# Patient Record
Sex: MALE | Race: OTHER | HISPANIC OR LATINO | Employment: FULL TIME | URBAN - METROPOLITAN AREA
[De-identification: names, ages, dates, MRNs, and addresses within clinical notes are randomized per-mention and may not be internally consistent; named-entity substitution may affect disease eponyms.]

---

## 2024-06-05 ENCOUNTER — HOSPITAL ENCOUNTER (EMERGENCY)
Facility: HOSPITAL | Age: 24
Discharge: HOME | End: 2024-06-05
Attending: EMERGENCY MEDICINE

## 2024-06-05 VITALS
TEMPERATURE: 98 F | DIASTOLIC BLOOD PRESSURE: 84 MMHG | SYSTOLIC BLOOD PRESSURE: 137 MMHG | HEIGHT: 66 IN | BODY MASS INDEX: 27.63 KG/M2 | WEIGHT: 171.94 LBS | OXYGEN SATURATION: 99 % | HEART RATE: 72 BPM | RESPIRATION RATE: 20 BRPM

## 2024-06-05 DIAGNOSIS — R55 SYNCOPE: ICD-10-CM

## 2024-06-05 DIAGNOSIS — L03.012 CELLULITIS OF FINGER OF LEFT HAND: Primary | ICD-10-CM

## 2024-06-05 LAB
ALBUMIN SERPL-MCNC: 4.4 G/DL (ref 3.5–5)
ALBUMIN/GLOB SERPL: 1.1 RATIO (ref 1.1–2)
ALP SERPL-CCNC: 117 UNIT/L (ref 40–150)
ALT SERPL-CCNC: 23 UNIT/L (ref 0–55)
ANION GAP SERPL CALC-SCNC: 8 MEQ/L
AST SERPL-CCNC: 16 UNIT/L (ref 5–34)
BASOPHILS # BLD AUTO: 0.03 X10(3)/MCL
BASOPHILS NFR BLD AUTO: 0.3 %
BILIRUB SERPL-MCNC: 0.5 MG/DL
BUN SERPL-MCNC: 12.2 MG/DL (ref 8.9–20.6)
CALCIUM SERPL-MCNC: 9.3 MG/DL (ref 8.4–10.2)
CHLORIDE SERPL-SCNC: 105 MMOL/L (ref 98–107)
CO2 SERPL-SCNC: 27 MMOL/L (ref 22–29)
CREAT SERPL-MCNC: 1 MG/DL (ref 0.73–1.18)
CREAT/UREA NIT SERPL: 12
EOSINOPHIL # BLD AUTO: 0.46 X10(3)/MCL (ref 0–0.9)
EOSINOPHIL NFR BLD AUTO: 4.3 %
ERYTHROCYTE [DISTWIDTH] IN BLOOD BY AUTOMATED COUNT: 14 % (ref 11.5–17)
GFR SERPLBLD CREATININE-BSD FMLA CKD-EPI: >60 ML/MIN/1.73/M2
GLOBULIN SER-MCNC: 4.1 GM/DL (ref 2.4–3.5)
GLUCOSE SERPL-MCNC: 86 MG/DL (ref 74–100)
HCT VFR BLD AUTO: 48.5 % (ref 42–52)
HGB BLD-MCNC: 15.7 G/DL (ref 14–18)
IMM GRANULOCYTES # BLD AUTO: 0.07 X10(3)/MCL (ref 0–0.04)
IMM GRANULOCYTES NFR BLD AUTO: 0.7 %
LACTATE SERPL-SCNC: 1.2 MMOL/L (ref 0.5–2.2)
LYMPHOCYTES # BLD AUTO: 1.87 X10(3)/MCL (ref 0.6–4.6)
LYMPHOCYTES NFR BLD AUTO: 17.5 %
MCH RBC QN AUTO: 26.4 PG (ref 27–31)
MCHC RBC AUTO-ENTMCNC: 32.4 G/DL (ref 33–36)
MCV RBC AUTO: 81.6 FL (ref 80–94)
MONOCYTES # BLD AUTO: 1.1 X10(3)/MCL (ref 0.1–1.3)
MONOCYTES NFR BLD AUTO: 10.3 %
NEUTROPHILS # BLD AUTO: 7.16 X10(3)/MCL (ref 2.1–9.2)
NEUTROPHILS NFR BLD AUTO: 66.9 %
NRBC BLD AUTO-RTO: 0 %
OHS QRS DURATION: 88 MS
OHS QTC CALCULATION: 426 MS
PLATELET # BLD AUTO: 332 X10(3)/MCL (ref 130–400)
PMV BLD AUTO: 8.9 FL (ref 7.4–10.4)
POTASSIUM SERPL-SCNC: 4.3 MMOL/L (ref 3.5–5.1)
PROT SERPL-MCNC: 8.5 GM/DL (ref 6.4–8.3)
RBC # BLD AUTO: 5.94 X10(6)/MCL (ref 4.7–6.1)
SODIUM SERPL-SCNC: 140 MMOL/L (ref 136–145)
WBC # SPEC AUTO: 10.69 X10(3)/MCL (ref 4.5–11.5)

## 2024-06-05 PROCEDURE — 87040 BLOOD CULTURE FOR BACTERIA: CPT | Performed by: EMERGENCY MEDICINE

## 2024-06-05 PROCEDURE — 63600175 PHARM REV CODE 636 W HCPCS: Performed by: EMERGENCY MEDICINE

## 2024-06-05 PROCEDURE — 80053 COMPREHEN METABOLIC PANEL: CPT | Performed by: EMERGENCY MEDICINE

## 2024-06-05 PROCEDURE — 93010 ELECTROCARDIOGRAM REPORT: CPT | Mod: ,,, | Performed by: INTERNAL MEDICINE

## 2024-06-05 PROCEDURE — 83605 ASSAY OF LACTIC ACID: CPT | Performed by: EMERGENCY MEDICINE

## 2024-06-05 PROCEDURE — 90715 TDAP VACCINE 7 YRS/> IM: CPT | Performed by: EMERGENCY MEDICINE

## 2024-06-05 PROCEDURE — 85025 COMPLETE CBC W/AUTO DIFF WBC: CPT | Performed by: EMERGENCY MEDICINE

## 2024-06-05 PROCEDURE — 93005 ELECTROCARDIOGRAM TRACING: CPT

## 2024-06-05 PROCEDURE — 99285 EMERGENCY DEPT VISIT HI MDM: CPT | Mod: 25

## 2024-06-05 PROCEDURE — 90471 IMMUNIZATION ADMIN: CPT | Performed by: EMERGENCY MEDICINE

## 2024-06-05 PROCEDURE — 96365 THER/PROPH/DIAG IV INF INIT: CPT

## 2024-06-05 RX ORDER — CLINDAMYCIN HYDROCHLORIDE 300 MG/1
300 CAPSULE ORAL EVERY 6 HOURS
Qty: 40 CAPSULE | Refills: 0 | Status: ON HOLD | OUTPATIENT
Start: 2024-06-05 | End: 2024-06-09 | Stop reason: HOSPADM

## 2024-06-05 RX ORDER — HYDROCODONE BITARTRATE AND ACETAMINOPHEN 5; 325 MG/1; MG/1
1 TABLET ORAL EVERY 6 HOURS PRN
Qty: 12 TABLET | Refills: 0 | Status: SHIPPED | OUTPATIENT
Start: 2024-06-05 | End: 2024-06-17

## 2024-06-05 RX ORDER — CLINDAMYCIN PHOSPHATE 900 MG/50ML
900 INJECTION, SOLUTION INTRAVENOUS
Status: COMPLETED | OUTPATIENT
Start: 2024-06-05 | End: 2024-06-05

## 2024-06-05 RX ADMIN — CLINDAMYCIN PHOSPHATE 900 MG: 900 INJECTION, SOLUTION INTRAVENOUS at 10:06

## 2024-06-05 RX ADMIN — TETANUS TOXOID, REDUCED DIPHTHERIA TOXOID AND ACELLULAR PERTUSSIS VACCINE, ADSORBED 0.5 ML: 5; 2.5; 8; 8; 2.5 SUSPENSION INTRAMUSCULAR at 10:06

## 2024-06-05 NOTE — Clinical Note
"Todd "Todd" Renny Garza was seen and treated in our emergency department on 6/5/2024.  He may return to work on 06/07/2024.       If you have any questions or concerns, please don't hesitate to call.      Elodia Powers MD"

## 2024-06-05 NOTE — DISCHARGE INSTRUCTIONS
Return immediately for any worsening symptoms or if it fails to improve over the next 3 days.  The CT scan did not show an abscess or foreign body but it is still possible there could be something in there that would require a procedure or surgery

## 2024-06-05 NOTE — ED PROVIDER NOTES
Encounter Date: 6/5/2024       History     Chief Complaint   Patient presents with    Hand Pain     L hand pain and swelling for 2 days. States thinks a splinter is in there. States got so bad he passed out     23-year-old male, [place] video , states that he had a puncture wound to left thumb about 2 weeks ago.  Over the last 2 days he has had redness pain and swelling to the left hand in his concerned he could have a splinter in his hand from two weeks ago but does not feel anything.  Yesterday, the pain was so severe that he passed out.  No fever.  No injuries.  Unknown tetanus vaccination.        Review of patient's allergies indicates:  No Known Allergies  No past medical history on file.  No past surgical history on file.  No family history on file.     Review of Systems   Musculoskeletal:         Hand pain   Neurological:  Positive for syncope.   All other systems reviewed and are negative.      Physical Exam     Initial Vitals [06/05/24 0954]   BP Pulse Resp Temp SpO2   120/78 92 20 98.3 °F (36.8 °C) 99 %      MAP       --         Physical Exam    Nursing note and vitals reviewed.  Constitutional: Vital signs are normal. He appears well-developed and well-nourished.   HENT:   Head: Normocephalic and atraumatic.   Mouth/Throat: Oropharynx is clear and moist.   Eyes: Pupils are equal, round, and reactive to light.   Neck: Neck supple. No JVD present.   Cardiovascular:  Normal rate, regular rhythm and normal heart sounds.           Pulmonary/Chest: Breath sounds normal. No respiratory distress.   Abdominal: Abdomen is soft. There is no abdominal tenderness.   Musculoskeletal:         General: No edema.      Cervical back: Neck supple. No edema or erythema.      Comments: Right hand has erythema and swelling over the metacarpophalangeal joint which is extremely tender and firm to palpation, no palpable fluctuance     Lymphadenopathy:     He has no cervical adenopathy.   Neurological: He is alert and  oriented to person, place, and time. No cranial nerve deficit. GCS score is 15. GCS eye subscore is 4. GCS verbal subscore is 5. GCS motor subscore is 6.   Skin: Skin is warm and dry. Capillary refill takes less than 2 seconds.         ED Course   Procedures  Labs Reviewed   COMPREHENSIVE METABOLIC PANEL - Abnormal; Notable for the following components:       Result Value    Protein Total 8.5 (*)     Globulin 4.1 (*)     All other components within normal limits   CBC WITH DIFFERENTIAL - Abnormal; Notable for the following components:    MCH 26.4 (*)     MCHC 32.4 (*)     IG# 0.07 (*)     All other components within normal limits   LACTIC ACID, PLASMA - Normal   BLOOD CULTURE OLG   BLOOD CULTURE OLG   CBC W/ AUTO DIFFERENTIAL    Narrative:     The following orders were created for panel order CBC auto differential.  Procedure                               Abnormality         Status                     ---------                               -----------         ------                     CBC with Differential[[id number]]       Abnormal            Final result                 Please view results for these tests on the individual orders.     EKG Readings: (Independently Interpreted)   Initial Reading: No STEMI. Rhythm: Normal Sinus Rhythm. Heart Rate: 86. ST Segments: Normal ST Segments. T Waves: Normal. Clinical Impression: Normal Sinus Rhythm     ECG Results              EKG 12-lead (Final result)        Collection Time Result Time QRS Duration OHS QTC Calculation    06/05/24 10:15:43 06/05/24 13:36:38 88 426                     Final result by Interface, Lab In [facility] (06/05/24 13:36:49)                   Narrative:    Test Reason : R55,    Vent. Rate : 086 BPM     Atrial Rate : 086 BPM     P-R Int : 150 ms          QRS Dur : 088 ms      QT Int : 356 ms       P-R-T Axes : 080 112 021 degrees     QTc Int : 426 ms    Normal sinus rhythm  Right axis deviation  Possible Right ventricular hypertrophy  Abnormal ECG  No  previous ECGs available  Confirmed by Sj Lees MD ([id number]) on 6/5/2024 1:36:32 PM    Referred By: [email or web address]   SELF           Confirmed By:Sj Lees MD                                  Imaging Results              CT Hand Without Contrast Left (Final result)  Result time 06/05/24 12:09:54      Final result by Mick Hobson MD (06/05/24 12:09:54)                   Impression:      Small foreign body as discussed.      Electronically signed by: Mick Hobson  Date:    06/05/2024  Time:    12:09               Narrative:    EXAMINATION:  CT HAND WITHOUT CONTRAST LEFT    CLINICAL HISTORY:  Soft tissue infection suspected, hand, xray done;Evaluate for foreign body left hand;    TECHNIQUE:  Helical acquisition through the left hand without IV contrast.  Three plane reconstructions were provided for review.  mGycm. Automatic exposure control, adjustment of mA/kV or iterative reconstruction technique was used to reduce radiation.    COMPARISON:  No priors    FINDINGS:  There is a 3-4 mm foreign body within the dorsal subcutaneous fat adjacent to the distal shaft of the 2nd metacarpal.  No other foreign body is seen.  No fracture or dislocation.  No fluid collection seen.                                       Medications   clindamycin in D5W 900 mg/50 mL IVPB 900 mg (0 mg Intravenous Stopped 6/5/24 1118)   Tdap (BOOSTRIX) vaccine injection 0.5 mL (0.5 mLs Intramuscular Given 6/5/24 1036)     Medical Decision Making  See HPI for narrative    Differential diagnosis includes but is not limited to cellulitis, abscess, foreign body, syncope    Problems Addressed:  Cellulitis of finger of left hand:     Details: Patient given IV antibiotics in the emergency room and hospital admission suggested but he declined.  Prescriptions were discussed using the Italian video  and ER return precautions were discussed as well    Amount and/or Complexity of Data Reviewed  Labs: ordered.  Radiology: ordered. Decision-making  "details documented in ED Course.    Risk  Prescription drug management.  Risk Details: No abscess or foreign body noted to the area of concern on the CT scan.  However, foreign body such as a splinter might not show on the CT               ED Course as of 06/05/24 1444   Wed Jun 05, 2024   1249 CT Hand Without Contrast Left  CT scan shows a metallic foreign body on the dorsal aspect of the hand over the 2nd metacarpal.  He does have a tiny discolored dot at this area which may be the foreign body.  There is no tenderness, redness, swelling, or sign of injury to this area.  The redness and swelling is to the volar aspect the thumb as seen above.  There is no palpable fluctuance and no abscess on the CT.  Hospital admission was discussed and he prefers to take the antibiotics at home.  Strict ER return precautions were discussed using the video .  This was discussed in the middle of the conversation and repeated again of the end of the conversation.  He understands to take his antibiotics every 6 hours and return immediately for any worsening symptoms. []   1255 Hospital admission discussed a second time and he definitely wants to try outpatient antibiotics first and promises to return if any worsening or failure to improve [SH]   1310 Nadine RN discussed hospital admission vs outpatient tx as well and pt says "No, no, I'm OK."  She repeated instructions to return immediately for any worsening symptoms []      ED Course User Index  [SH] Elodia Powers MD                             Clinical Impression:  Final diagnoses:  [R55] Syncope  [L03.012] Cellulitis of finger of left hand (Primary)          ED Disposition Condition    Discharge Stable          ED Prescriptions       Medication Sig Dispense Start Date End Date Auth. Provider    clindamycin (CLEOCIN) 300 MG capsule Take 1 capsule (300 mg total) by mouth every 6 (six) hours. 40 capsule 6/5/2024 -- Elodia Powers MD    " HYDROcodone-acetaminophen (NORCO) 5-325 mg per tablet Take 1 tablet by mouth every 6 (six) hours as needed for Pain. 12 tablet 6/5/2024 -- Elodia Powers MD          Follow-up Information    None          Elodia Powers MD  06/05/24 1257       Elodia Powers MD  06/05/24 9105

## 2024-06-06 ENCOUNTER — HOSPITAL ENCOUNTER (INPATIENT)
Facility: HOSPITAL | Age: 24
LOS: 3 days | Discharge: HOME | DRG: 580 | End: 2024-06-09
Attending: EMERGENCY MEDICINE | Admitting: INTERNAL MEDICINE

## 2024-06-06 DIAGNOSIS — L02.512 ABSCESS OF LEFT HAND: Primary | ICD-10-CM

## 2024-06-06 LAB
ALBUMIN SERPL-MCNC: 4.2 G/DL (ref 3.5–5)
ALBUMIN/GLOB SERPL: 1 RATIO (ref 1.1–2)
ALP SERPL-CCNC: 116 UNIT/L (ref 40–150)
ALT SERPL-CCNC: 19 UNIT/L (ref 0–55)
ANION GAP SERPL CALC-SCNC: 11 MEQ/L
AST SERPL-CCNC: 14 UNIT/L (ref 5–34)
BASOPHILS # BLD AUTO: 0.03 X10(3)/MCL
BASOPHILS NFR BLD AUTO: 0.3 %
BILIRUB SERPL-MCNC: 0.5 MG/DL
BUN SERPL-MCNC: 12.5 MG/DL (ref 8.9–20.6)
CALCIUM SERPL-MCNC: 9.8 MG/DL (ref 8.4–10.2)
CHLORIDE SERPL-SCNC: 105 MMOL/L (ref 98–107)
CO2 SERPL-SCNC: 23 MMOL/L (ref 22–29)
CREAT SERPL-MCNC: 0.83 MG/DL (ref 0.73–1.18)
CREAT/UREA NIT SERPL: 15
EOSINOPHIL # BLD AUTO: 0.35 X10(3)/MCL (ref 0–0.9)
EOSINOPHIL NFR BLD AUTO: 3.5 %
ERYTHROCYTE [DISTWIDTH] IN BLOOD BY AUTOMATED COUNT: 14.1 % (ref 11.5–17)
GFR SERPLBLD CREATININE-BSD FMLA CKD-EPI: >60 ML/MIN/1.73/M2
GLOBULIN SER-MCNC: 4.3 GM/DL (ref 2.4–3.5)
GLUCOSE SERPL-MCNC: 86 MG/DL (ref 74–100)
HCT VFR BLD AUTO: 47 % (ref 42–52)
HGB BLD-MCNC: 15.7 G/DL (ref 14–18)
IMM GRANULOCYTES # BLD AUTO: 0.04 X10(3)/MCL (ref 0–0.04)
IMM GRANULOCYTES NFR BLD AUTO: 0.4 %
LYMPHOCYTES # BLD AUTO: 1.92 X10(3)/MCL (ref 0.6–4.6)
LYMPHOCYTES NFR BLD AUTO: 19.3 %
MCH RBC QN AUTO: 26.6 PG (ref 27–31)
MCHC RBC AUTO-ENTMCNC: 33.4 G/DL (ref 33–36)
MCV RBC AUTO: 79.7 FL (ref 80–94)
MONOCYTES # BLD AUTO: 1.08 X10(3)/MCL (ref 0.1–1.3)
MONOCYTES NFR BLD AUTO: 10.9 %
NEUTROPHILS # BLD AUTO: 6.53 X10(3)/MCL (ref 2.1–9.2)
NEUTROPHILS NFR BLD AUTO: 65.6 %
NRBC BLD AUTO-RTO: 0 %
PLATELET # BLD AUTO: 347 X10(3)/MCL (ref 130–400)
PMV BLD AUTO: 9 FL (ref 7.4–10.4)
POTASSIUM SERPL-SCNC: 3.9 MMOL/L (ref 3.5–5.1)
PROT SERPL-MCNC: 8.5 GM/DL (ref 6.4–8.3)
RBC # BLD AUTO: 5.9 X10(6)/MCL (ref 4.7–6.1)
SODIUM SERPL-SCNC: 139 MMOL/L (ref 136–145)
WBC # SPEC AUTO: 9.95 X10(3)/MCL (ref 4.5–11.5)

## 2024-06-06 PROCEDURE — 63600175 PHARM REV CODE 636 W HCPCS: Performed by: EMERGENCY MEDICINE

## 2024-06-06 PROCEDURE — 96365 THER/PROPH/DIAG IV INF INIT: CPT

## 2024-06-06 PROCEDURE — 80053 COMPREHEN METABOLIC PANEL: CPT | Performed by: EMERGENCY MEDICINE

## 2024-06-06 PROCEDURE — 63600175 PHARM REV CODE 636 W HCPCS: Performed by: INTERNAL MEDICINE

## 2024-06-06 PROCEDURE — 99285 EMERGENCY DEPT VISIT HI MDM: CPT

## 2024-06-06 PROCEDURE — 25000003 PHARM REV CODE 250: Performed by: EMERGENCY MEDICINE

## 2024-06-06 PROCEDURE — 0J9K0ZZ DRAINAGE OF LEFT HAND SUBCUTANEOUS TISSUE AND FASCIA, OPEN APPROACH: ICD-10-PCS | Performed by: EMERGENCY MEDICINE

## 2024-06-06 PROCEDURE — 96367 TX/PROPH/DG ADDL SEQ IV INF: CPT

## 2024-06-06 PROCEDURE — 25000003 PHARM REV CODE 250: Performed by: INTERNAL MEDICINE

## 2024-06-06 PROCEDURE — 87070 CULTURE OTHR SPECIMN AEROBIC: CPT | Performed by: EMERGENCY MEDICINE

## 2024-06-06 PROCEDURE — 10060 I&D ABSCESS SIMPLE/SINGLE: CPT

## 2024-06-06 PROCEDURE — 11000001 HC ACUTE MED/SURG PRIVATE ROOM

## 2024-06-06 PROCEDURE — 85025 COMPLETE CBC W/AUTO DIFF WBC: CPT | Performed by: EMERGENCY MEDICINE

## 2024-06-06 PROCEDURE — 96375 TX/PRO/DX INJ NEW DRUG ADDON: CPT

## 2024-06-06 RX ORDER — HYDROMORPHONE HYDROCHLORIDE 2 MG/ML
1 INJECTION, SOLUTION INTRAMUSCULAR; INTRAVENOUS; SUBCUTANEOUS
Status: COMPLETED | OUTPATIENT
Start: 2024-06-06 | End: 2024-06-06

## 2024-06-06 RX ORDER — KETOROLAC TROMETHAMINE 30 MG/ML
30 INJECTION, SOLUTION INTRAMUSCULAR; INTRAVENOUS
Status: COMPLETED | OUTPATIENT
Start: 2024-06-06 | End: 2024-06-06

## 2024-06-06 RX ORDER — TALC
6 POWDER (GRAM) TOPICAL NIGHTLY PRN
Status: DISCONTINUED | OUTPATIENT
Start: 2024-06-06 | End: 2024-06-09 | Stop reason: HOSPADM

## 2024-06-06 RX ORDER — ACETAMINOPHEN 325 MG/1
650 TABLET ORAL EVERY 6 HOURS PRN
Status: DISCONTINUED | OUTPATIENT
Start: 2024-06-06 | End: 2024-06-09 | Stop reason: HOSPADM

## 2024-06-06 RX ORDER — LIDOCAINE HYDROCHLORIDE 10 MG/ML
5 INJECTION INFILTRATION; PERINEURAL ONCE
Status: COMPLETED | OUTPATIENT
Start: 2024-06-06 | End: 2024-06-06

## 2024-06-06 RX ORDER — ONDANSETRON HYDROCHLORIDE 2 MG/ML
4 INJECTION, SOLUTION INTRAVENOUS
Status: COMPLETED | OUTPATIENT
Start: 2024-06-06 | End: 2024-06-06

## 2024-06-06 RX ORDER — MORPHINE SULFATE 4 MG/ML
4 INJECTION, SOLUTION INTRAMUSCULAR; INTRAVENOUS EVERY 4 HOURS PRN
Status: DISCONTINUED | OUTPATIENT
Start: 2024-06-06 | End: 2024-06-09 | Stop reason: HOSPADM

## 2024-06-06 RX ORDER — HYDROCODONE BITARTRATE AND ACETAMINOPHEN 5; 325 MG/1; MG/1
1 TABLET ORAL EVERY 4 HOURS PRN
Status: DISCONTINUED | OUTPATIENT
Start: 2024-06-06 | End: 2024-06-09 | Stop reason: HOSPADM

## 2024-06-06 RX ORDER — SODIUM CHLORIDE 0.9 % (FLUSH) 0.9 %
10 SYRINGE (ML) INJECTION
Status: DISCONTINUED | OUTPATIENT
Start: 2024-06-06 | End: 2024-06-09 | Stop reason: HOSPADM

## 2024-06-06 RX ORDER — ONDANSETRON HYDROCHLORIDE 2 MG/ML
4 INJECTION, SOLUTION INTRAVENOUS EVERY 8 HOURS PRN
Status: DISCONTINUED | OUTPATIENT
Start: 2024-06-06 | End: 2024-06-09 | Stop reason: HOSPADM

## 2024-06-06 RX ORDER — IBUPROFEN 400 MG/1
400 TABLET ORAL EVERY 6 HOURS PRN
Status: DISCONTINUED | OUTPATIENT
Start: 2024-06-06 | End: 2024-06-09 | Stop reason: HOSPADM

## 2024-06-06 RX ADMIN — ACETAMINOPHEN 650 MG: 325 TABLET ORAL at 06:06

## 2024-06-06 RX ADMIN — ONDANSETRON 4 MG: 2 INJECTION INTRAMUSCULAR; INTRAVENOUS at 11:06

## 2024-06-06 RX ADMIN — HYDROMORPHONE HYDROCHLORIDE 1 MG: 2 INJECTION, SOLUTION INTRAMUSCULAR; INTRAVENOUS; SUBCUTANEOUS at 11:06

## 2024-06-06 RX ADMIN — VANCOMYCIN HYDROCHLORIDE 1250 MG: 1.25 INJECTION, POWDER, LYOPHILIZED, FOR SOLUTION INTRAVENOUS at 11:06

## 2024-06-06 RX ADMIN — PIPERACILLIN AND TAZOBACTAM 4.5 G: 4; .5 INJECTION, POWDER, LYOPHILIZED, FOR SOLUTION INTRAVENOUS; PARENTERAL at 11:06

## 2024-06-06 RX ADMIN — KETOROLAC TROMETHAMINE 30 MG: 30 INJECTION, SOLUTION INTRAMUSCULAR at 01:06

## 2024-06-06 RX ADMIN — PIPERACILLIN SODIUM AND TAZOBACTAM SODIUM 4.5 G: 4; .5 INJECTION, POWDER, LYOPHILIZED, FOR SOLUTION INTRAVENOUS at 09:06

## 2024-06-06 RX ADMIN — VANCOMYCIN HYDROCHLORIDE 1500 MG: 1.5 INJECTION, POWDER, LYOPHILIZED, FOR SOLUTION INTRAVENOUS at 12:06

## 2024-06-06 RX ADMIN — LIDOCAINE HYDROCHLORIDE 5 ML: 10 INJECTION, SOLUTION INFILTRATION; PERINEURAL at 01:06

## 2024-06-06 NOTE — PROGRESS NOTES
"Pharmacokinetic Initial Assessment: IV Vancomycin    Assessment/Plan:    Initiate intravenous vancomycin with loading dose of 1,500 mg once followed by a maintenance dose of vancomycin 1,250mg IV every 12 hours  Desired empiric serum trough concentration is 15 to 20 mcg/mL  Draw vancomycin trough level 60 min prior to fourth dose on 06/07 at approximately 2230  Pharmacy will continue to follow and monitor vancomycin.      Please contact pharmacy at extension [phone] with any questions regarding this assessment.     Thank you for the consult,   Sudha Corralo       Patient brief summary:  Todd Garza is a 23 y.o. male initiated on antimicrobial therapy with IV Vancomycin for treatment of suspected skin & soft tissue infection    Drug Allergies:   Review of patient's allergies indicates:  No Known Allergies    Actual Body Weight:   Wt Readings from Last 1 Encounters:   06/06/24 78 kg (172 lb)       Renal Function:   Estimated Creatinine Clearance: 134.9 mL/min (based on SCr of 0.83 mg/dL).,     Dialysis Method (if applicable):  N/A    CBC (last 72 hours):  Recent Labs   Lab Result Units 06/05/24  1035 06/06/24  1101   WBC x10(3)/mcL 10.69 9.95   Hgb g/dL 15.7 15.7   Hct % 48.5 47.0   Platelet x10(3)/mcL 332 347   Mono % % 10.3 10.9   Eos % % 4.3 3.5   Basophil % % 0.3 0.3       Metabolic Panel (last 72 hours):  Recent Labs   Lab Result Units 06/05/24  1035 06/06/24  1101   Sodium mmol/L 140 139   Potassium mmol/L 4.3 3.9   Chloride mmol/L 105 105   CO2 mmol/L 27 23   Glucose mg/dL 86 86   Blood Urea Nitrogen mg/dL 12.2 12.5   Creatinine mg/dL 1.00 0.83   Albumin g/dL 4.4 4.2   Bilirubin Total mg/dL 0.5 0.5   ALP unit/L 117 116   AST unit/L 16 14   ALT unit/L 23 19       Drug levels (last 3 results):  No results for input(s): "VANCOMYCINRA", "VANCORANDOM", "VANCOMYCINPE", "VANCOPEAK", "VANCOMYCINTR", "VANCOTROUGH" in the last 72 hours.    Microbiologic Results:  Microbiology Results (last 7 days)       " ** No results found for the last 168 hours. **

## 2024-06-06 NOTE — Clinical Note
Diagnosis: Abscess of left hand [[id number]]  Future Attending Provider: RUTHIE TALAMANTES [[id number]]  Admit to which facility:: [facility] [[id number]]  Reason for IP Medical Treatment  (Clinical interventions that can only be accom plished in the IP setting? ) :: Abscess, failed outpatient therapy  I certify that Inpatient services for greater than or equal to 2 midnights are medically necessary:: Yes  Plans for Post-Acute care--if anticipated (pick the single best option):: A.  No post acute care anticipated at this time  Special Needs::  [18]

## 2024-06-06 NOTE — NURSING
Nurses Note -- 4 Eyes      6/6/2024   6:02 PM      Skin assessed during: Admit      [] No Altered Skin Integrity Present    []Prevention Measures Documented      [x] Yes- Altered Skin Integrity Present or Discovered   [] LDA Added if Not in Epic (Describe Wound)   [x] New Altered Skin Integrity was Present on Admit and Documented in LDA   [] Wound Image Taken    Wound Care Consulted? No    Attending Nurse:  Kathryn Schaeffer RN/Staff Member:   Belia

## 2024-06-06 NOTE — H&P
[facility]    [facility] Medicine History & Physical Examination       Patient Name: Todd Garza  MRN: [id number]  Patient Class: IP- Inpatient   Admission Date: 6/6/2024   Admitting Physician: NADYA Service   Length of Stay: 0  Attending Physician: Ollie Bolaños MD  Primary Care Provider: Tita, Primary Doctor  Face-to-Face encounter date: 06/06/2024    Code Status: Full Code    Chief Complaint: Hand Pain (C/o left hand pain s/p possible abscess. Sent home with PO ABX and took one dose before coming to ED today )          HISTORY OF PRESENT ILLNESS:   Todd Garza is a 23 y.o. male who  has a past medical history of Known health problems: none. The patient presented to [facility] on 6/6/2024 with a primary complaint of left hand infection. He was seen yesterday and was discharged on clindamycin. His symptoms persisted so he returned to ER today. An I&D was performed by the ER staff and a wound cx was sent. This infection started about 2 weeks ago due to a wood splinter. He denies any other trauma. PT is a [place] speaker, used translation service.    PAST MEDICAL HISTORY:     Past Medical History:   Diagnosis Date    Known health problems: none        PAST SURGICAL HISTORY:     Past Surgical History:   Procedure Laterality Date    none         ALLERGIES:   Patient has no known allergies.    FAMILY HISTORY:   family history includes No Known Problems in his father and mother.    SOCIAL HISTORY:     Social History     Tobacco Use    Smoking status: Never    Smokeless tobacco: Never   Substance Use Topics    Alcohol use: Never        HOME MEDICATIONS:     Prior to Admission medications    Medication Sig Start Date End Date Taking? Authorizing Provider   clindamycin (CLEOCIN) 300 MG capsule Take 1 capsule (300 mg total) by mouth every 6 (six) hours. 6/5/24   Elodia Powers MD   HYDROcodone-acetaminophen (NORCO) 5-325 mg per tablet Take 1 tablet  by mouth every 6 (six) hours as needed for Pain. 6/5/24   Elodia Powers MD       REVIEW OF SYSTEMS:   Except as documented, all other systems reviewed and negative   Review of Systems   Constitutional:  Negative for chills and fever.   Respiratory:  Negative for shortness of breath.    Gastrointestinal:  Negative for nausea and vomiting.   All other systems reviewed and are negative.        PHYSICAL EXAM:     VITAL SIGNS: 24 HRS MIN & MAX LAST   Temp  Min: 98.1 °F (36.7 °C)  Max: 98.8 °F (37.1 °C) 98.8 °F (37.1 °C)   BP  Min: 102/60  Max: 145/80 102/60   Pulse  Min: 88  Max: 101  88   Resp  Min: 18  Max: 20 20   SpO2  Min: 96 %  Max: 97 % 96 %       General appearance: Well-developed, well-nourished male in no apparent distress.  HENT: Atraumatic head. Moist mucous membranes of oral cavity.  Eyes: Normal extraocular movements.   Neck: Supple.   Lungs: Clear to auscultation bilaterally. No wheezing present.   Heart: Regular rate and rhythm. S1 and S2 present with no murmurs/gallop/rub. No pedal edema. No JVD present.   Abdomen: Soft, non-distended, non-tender. No rebound tenderness/guarding. Bowel sounds are normal.   Extremities: Left hand bandaged, wound pics reviewed. No cyanosis, clubbing, or edema.  Skin: No Rash.   Neuro: Motor and sensory exams grossly intact. Good tone.   Psych/mental status: Appropriate mood and affect. Responds appropriately to questions.     LABS AND IMAGING:     Recent Labs   Lab 06/05/24  1035 06/06/24  1101   WBC 10.69 9.95   RBC 5.94 5.90   HGB 15.7 15.7   HCT 48.5 47.0   MCV 81.6 79.7*   MCH 26.4* 26.6*   MCHC 32.4* 33.4   RDW 14.0 14.1    347   MPV 8.9 9.0       Recent Labs   Lab 06/05/24  1035 06/06/24  1101    139   K 4.3 3.9    105   CO2 27 23   BUN 12.2 12.5   CREATININE 1.00 0.83   CALCIUM 9.3 9.8   ALBUMIN 4.4 4.2   ALKPHOS 117 116   ALT 23 19   AST 16 14   BILITOT 0.5 0.5       Microbiology Results (last 7 days)       Procedure Component Value Units  Date/Time    Wound Culture [[id number]] Collected: 06/06/24 1426    Order Status: Sent Specimen: Abscess from Hand, Left Updated: 06/06/24 1429             CT Hand Without Contrast Left  Narrative: EXAMINATION:  CT HAND WITHOUT CONTRAST LEFT    CLINICAL HISTORY:  Soft tissue infection suspected, hand, xray done;Evaluate for foreign body left hand;    TECHNIQUE:  Helical acquisition through the left hand without IV contrast.  Three plane reconstructions were provided for review.  mGycm. Automatic exposure control, adjustment of mA/kV or iterative reconstruction technique was used to reduce radiation.    COMPARISON:  No priors    FINDINGS:  There is a 3-4 mm foreign body within the dorsal subcutaneous fat adjacent to the distal shaft of the 2nd metacarpal.  No other foreign body is seen.  No fracture or dislocation.  No fluid collection seen.  Impression: Small foreign body as discussed.    Electronically signed by: Mick Hobson  Date:    06/05/2024  Time:    12:09      Recent Results (from the past 24 hour(s))   CBC with Differential    Collection Time: 06/06/24 11:01 AM   Result Value Ref Range    WBC 9.95 4.50 - 11.50 x10(3)/mcL    RBC 5.90 4.70 - 6.10 x10(6)/mcL    Hgb 15.7 14.0 - 18.0 g/dL    Hct 47.0 42.0 - 52.0 %    MCV 79.7 (L) 80.0 - 94.0 fL    MCH 26.6 (L) 27.0 - 31.0 pg    MCHC 33.4 33.0 - 36.0 g/dL    RDW 14.1 11.5 - 17.0 %    Platelet 347 130 - 400 x10(3)/mcL    MPV 9.0 7.4 - 10.4 fL    Neut % 65.6 %    Lymph % 19.3 %    Mono % 10.9 %    Eos % 3.5 %    Basophil % 0.3 %    Lymph # 1.92 0.6 - 4.6 x10(3)/mcL    Neut # 6.53 2.1 - 9.2 x10(3)/mcL    Mono # 1.08 0.1 - 1.3 x10(3)/mcL    Eos # 0.35 0 - 0.9 x10(3)/mcL    Baso # 0.03 <=0.2 x10(3)/mcL    IG# 0.04 0 - 0.04 x10(3)/mcL    IG% 0.4 %    NRBC% 0.0 %   Comprehensive Metabolic Panel    Collection Time: 06/06/24 11:01 AM   Result Value Ref Range    Sodium 139 136 - 145 mmol/L    Potassium 3.9 3.5 - 5.1 mmol/L    Chloride 105 98 - 107 mmol/L    CO2 23  22 - 29 mmol/L    Glucose 86 74 - 100 mg/dL    Blood Urea Nitrogen 12.5 8.9 - 20.6 mg/dL    Creatinine 0.83 0.73 - 1.18 mg/dL    Calcium 9.8 8.4 - 10.2 mg/dL    Protein Total 8.5 (H) 6.4 - 8.3 gm/dL    Albumin 4.2 3.5 - 5.0 g/dL    Globulin 4.3 (H) 2.4 - 3.5 gm/dL    Albumin/Globulin Ratio 1.0 (L) 1.1 - 2.0 ratio    Bilirubin Total 0.5 <=1.5 mg/dL     40 - 150 unit/L    ALT 19 0 - 55 unit/L    AST 14 5 - 34 unit/L    eGFR >60 mL/min/1.73/m2    Anion Gap 11.0 mEq/L    BUN/Creatinine Ratio 15      __________________________________________________________________________  INPATIENT LIST OF MEDICATIONS     Scheduled Meds:   piperacillin-tazobactam (Zosyn) IV (PEDS and ADULTS) (extended infusion is not appropriate)  4.5 g Intravenous Q8H    vancomycin (VANCOCIN) IV (PEDS and ADULTS)  1,250 mg Intravenous Q12H               ASSESSMENT & PLAN:     Left hand abscess w/ suspected foreign body    Plan  Unclear if foreign body removed with I&D  Cont abx, zosyn and vanc  Follow wound cx  Switch to oral abx once infection improves        Ollie Bolaños MD   06/06/2024

## 2024-06-06 NOTE — ED PROVIDER NOTES
Encounter Date: 6/6/2024       History     Chief Complaint   Patient presents with    Hand Pain     C/o left hand pain s/p possible abscess. Sent home with PO ABX and took one dose before coming to ED today      23-year-old male returns to the emergency room today for continued left hand pain and concern for abscess.  He states the pain is a little bit better overall but now the center part of it has a little white dot.  No fever.  He states he did take his clindamycin and he was given clindamycin IV yesterday followed by 300 mg q.6 hours.  He states this wound started as a puncture wound about 2 weeks ago but did not start swelling and becoming red and painful until about 3 days ago.  [place] video  used    The history is provided by the patient.     Review of patient's allergies indicates:  No Known Allergies  No past medical history on file.  No past surgical history on file.  No family history on file.     Review of Systems   Musculoskeletal:         Hand pain   Skin:  Positive for wound.   All other systems reviewed and are negative.      Physical Exam     Initial Vitals [06/06/24 1041]   BP Pulse Resp Temp SpO2   (!) 145/80 101 18 98.5 °F (36.9 °C) 97 %      MAP       --         Physical Exam    Nursing note and vitals reviewed.  Constitutional: Vital signs are normal. He appears well-developed and well-nourished.   HENT:   Head: Normocephalic and atraumatic.   Mouth/Throat: Oropharynx is clear and moist.   Eyes: Pupils are equal, round, and reactive to light.   Neck: Neck supple. No JVD present.   Cardiovascular:  Normal rate, regular rhythm and normal heart sounds.           Pulmonary/Chest: Breath sounds normal. No respiratory distress.   Abdominal: Abdomen is soft. There is no abdominal tenderness.   Musculoskeletal:      Cervical back: Neck supple. No edema or erythema.      Comments: Right hand has erythema and swelling over the thenar eminence with similar appearance to yesterday accept today  there is a small white dot at the center concerning for abscess     Lymphadenopathy:     He has no cervical adenopathy.   Neurological: He is alert and oriented to person, place, and time. No cranial nerve deficit. GCS score is 15. GCS eye subscore is 4. GCS verbal subscore is 5. GCS motor subscore is 6.   Skin: Skin is warm and dry. Capillary refill takes less than 2 seconds.         ED Course   I & D - Incision and Drainage    Date/Time: 6/6/2024 11:34 AM  Location procedure was performed: [facility] EMERGENCY DEPARTMENT    Performed by: Elodia Powers MD  Authorized by: Elodia Powers MD  Pre-operative diagnosis: Hand abscess  Post-operative diagnosis: Hand abscess  Consent Done: Yes  Consent: Verbal consent obtained.  Risks and benefits: risks, benefits and alternatives were discussed ([place] video  use)  Consent given by: patient  Patient understanding: patient states understanding of the procedure being performed  Patient consent: the patient's understanding of the procedure matches consent given  Patient identity confirmed: verbally with patient  Location: Left hand.    Patient sedated: no  Description of findings: 1 cm incision was made and hemostats was used with scant purulent drainage, culture obtained   Scalpel size: 11  Incision type: single straight  Incision depth: subcutaneous  Complexity: simple  Drainage: pus  Drainage amount: scant  Wound treatment: incision, drainage, deloculation and expression of material  Packing material: 1/4 in gauze  Technical procedures used: Incision blade, hemostats used for blunt dissection, small cavity found approximately 1 cm deep with scant purulence drainage, wound was irrigated and then packed with 1/4 inch iodoform gauze  Estimated blood loss (mL): 2  Specimens: No  Implants: No  Patient tolerance: Patient tolerated the procedure well with no immediate complications    Incision depth: subcutaneous        Labs Reviewed   CBC WITH DIFFERENTIAL -  Abnormal; Notable for the following components:       Result Value    MCV 79.7 (*)     MCH 26.6 (*)     All other components within normal limits   COMPREHENSIVE METABOLIC PANEL - Abnormal; Notable for the following components:    Protein Total 8.5 (*)     Globulin 4.3 (*)     Albumin/Globulin Ratio 1.0 (*)     All other components within normal limits   WOUND CULTURE (OLG)          Imaging Results    None          Medications   piperacillin-tazobactam (ZOSYN) 4.5 g in dextrose 5 % in water (D5W) 100 mL IVPB (MB+) (0 g Intravenous Stopped 6/6/24 1132)   vancomycin - pharmacy to dose (has no administration in time range)   vancomycin 1,250 mg in dextrose 5 % (D5W) 250 mL IVPB (Vial-Mate) (has no administration in time range)   sodium chloride 0.9% flush 10 mL (has no administration in time range)   melatonin tablet 6 mg (has no administration in time range)   HYDROcodone-acetaminophen 5-325 mg per tablet 1 tablet (has no administration in time range)   morphine injection 4 mg (has no administration in time range)   ondansetron injection 4 mg (has no administration in time range)   LIDOcaine HCL 10 mg/ml (1%) injection 5 mL (5 mLs Other Given 6/6/24 1307)   HYDROmorphone (PF) injection 1 mg (1 mg Intravenous Given 6/6/24 1103)   ondansetron injection 4 mg (4 mg Intravenous Given 6/6/24 1102)   vancomycin 1,500 mg in dextrose 5 % (D5W) 250 mL IVPB (Vial-Mate) (0 mg Intravenous Stopped 6/6/24 1400)   ketorolac injection 30 mg (30 mg Intravenous Given 6/6/24 1312)     Medical Decision Making  See HPI for narrative    Differential diagnosis includes but is not limited to abscess, cellulitis, retained foreign body, failed outpatient treatment    Amount and/or Complexity of Data Reviewed  Labs: ordered.  Discussion of management or test interpretation with external provider(s): Case discussed with Roxie nurse practitioner and patient will be admitted to Dr. Neville    Bed opened up at [facility] so  admit was changed to Dr. Bolaños    Risk  Decision regarding hospitalization.                                      Clinical Impression:  Final diagnoses:  [L02.512] Abscess of left hand (Primary)          ED Disposition Condition    Admit                 Elodia Powers MD  06/06/24 1304       Elodia Powers MD  06/06/24 4311

## 2024-06-06 NOTE — ED TRIAGE NOTES
C/o left hand pain s/p possible abscess. Sent home with PO ABX and took one dose before coming to ED today

## 2024-06-07 ENCOUNTER — ANESTHESIA (OUTPATIENT)
Dept: SURGERY | Facility: HOSPITAL | Age: 24
DRG: 580 | End: 2024-06-07

## 2024-06-07 ENCOUNTER — ANESTHESIA EVENT (OUTPATIENT)
Dept: SURGERY | Facility: HOSPITAL | Age: 24
DRG: 580 | End: 2024-06-07

## 2024-06-07 LAB
ANION GAP SERPL CALC-SCNC: 10 MEQ/L
ANION GAP SERPL CALC-SCNC: 8 MEQ/L
BUN SERPL-MCNC: 25.4 MG/DL (ref 8.9–20.6)
BUN SERPL-MCNC: 27.5 MG/DL (ref 8.9–20.6)
CALCIUM SERPL-MCNC: 8.8 MG/DL (ref 8.4–10.2)
CALCIUM SERPL-MCNC: 9.4 MG/DL (ref 8.4–10.2)
CHLORIDE SERPL-SCNC: 109 MMOL/L (ref 98–107)
CHLORIDE SERPL-SCNC: 113 MMOL/L (ref 98–107)
CO2 SERPL-SCNC: 20 MMOL/L (ref 22–29)
CO2 SERPL-SCNC: 21 MMOL/L (ref 22–29)
CREAT SERPL-MCNC: 2.33 MG/DL (ref 0.73–1.18)
CREAT SERPL-MCNC: 2.39 MG/DL (ref 0.73–1.18)
CREAT/UREA NIT SERPL: 11
CREAT/UREA NIT SERPL: 12
CRP SERPL HS-MCNC: 128.14 MG/L
ERYTHROCYTE [DISTWIDTH] IN BLOOD BY AUTOMATED COUNT: 13.9 % (ref 11.5–17)
ERYTHROCYTE [SEDIMENTATION RATE] IN BLOOD: 38 MM/HR (ref 0–15)
GFR SERPLBLD CREATININE-BSD FMLA CKD-EPI: 38 ML/MIN/1.73/M2
GFR SERPLBLD CREATININE-BSD FMLA CKD-EPI: 39 ML/MIN/1.73/M2
GLUCOSE SERPL-MCNC: 109 MG/DL (ref 74–100)
GLUCOSE SERPL-MCNC: 96 MG/DL (ref 74–100)
HCT VFR BLD AUTO: 43.2 % (ref 42–52)
HGB BLD-MCNC: 14.6 G/DL (ref 14–18)
MCH RBC QN AUTO: 27.2 PG (ref 27–31)
MCHC RBC AUTO-ENTMCNC: 33.8 G/DL (ref 33–36)
MCV RBC AUTO: 80.4 FL (ref 80–94)
NRBC BLD AUTO-RTO: 0 %
PLATELET # BLD AUTO: 281 X10(3)/MCL (ref 130–400)
PMV BLD AUTO: 9.4 FL (ref 7.4–10.4)
POTASSIUM SERPL-SCNC: 3.8 MMOL/L (ref 3.5–5.1)
POTASSIUM SERPL-SCNC: 4 MMOL/L (ref 3.5–5.1)
RBC # BLD AUTO: 5.37 X10(6)/MCL (ref 4.7–6.1)
SODIUM SERPL-SCNC: 139 MMOL/L (ref 136–145)
SODIUM SERPL-SCNC: 142 MMOL/L (ref 136–145)
VANCOMYCIN TROUGH SERPL-MCNC: 14.6 UG/ML (ref 15–20)
WBC # SPEC AUTO: 10.59 X10(3)/MCL (ref 4.5–11.5)

## 2024-06-07 PROCEDURE — 87205 SMEAR GRAM STAIN: CPT | Performed by: STUDENT IN AN ORGANIZED HEALTH CARE EDUCATION/TRAINING PROGRAM

## 2024-06-07 PROCEDURE — 25000003 PHARM REV CODE 250: Performed by: NURSE ANESTHETIST, CERTIFIED REGISTERED

## 2024-06-07 PROCEDURE — 71000033 HC RECOVERY, INTIAL HOUR: Performed by: STUDENT IN AN ORGANIZED HEALTH CARE EDUCATION/TRAINING PROGRAM

## 2024-06-07 PROCEDURE — 87075 CULTR BACTERIA EXCEPT BLOOD: CPT | Performed by: STUDENT IN AN ORGANIZED HEALTH CARE EDUCATION/TRAINING PROGRAM

## 2024-06-07 PROCEDURE — 36415 COLL VENOUS BLD VENIPUNCTURE: CPT | Performed by: INTERNAL MEDICINE

## 2024-06-07 PROCEDURE — 99223 1ST HOSP IP/OBS HIGH 75: CPT | Mod: 57,,, | Performed by: STUDENT IN AN ORGANIZED HEALTH CARE EDUCATION/TRAINING PROGRAM

## 2024-06-07 PROCEDURE — 80048 BASIC METABOLIC PNL TOTAL CA: CPT | Performed by: INTERNAL MEDICINE

## 2024-06-07 PROCEDURE — 26055 INCISE FINGER TENDON SHEATH: CPT | Mod: 51,F4,, | Performed by: STUDENT IN AN ORGANIZED HEALTH CARE EDUCATION/TRAINING PROGRAM

## 2024-06-07 PROCEDURE — 36000707: Performed by: STUDENT IN AN ORGANIZED HEALTH CARE EDUCATION/TRAINING PROGRAM

## 2024-06-07 PROCEDURE — 25000003 PHARM REV CODE 250: Performed by: ANESTHESIOLOGY

## 2024-06-07 PROCEDURE — 63600175 PHARM REV CODE 636 W HCPCS: Performed by: INTERNAL MEDICINE

## 2024-06-07 PROCEDURE — 26508 RELEASE THUMB CONTRACTURE: CPT | Mod: LT,,, | Performed by: STUDENT IN AN ORGANIZED HEALTH CARE EDUCATION/TRAINING PROGRAM

## 2024-06-07 PROCEDURE — 0LN80ZZ RELEASE LEFT HAND TENDON, OPEN APPROACH: ICD-10-PCS | Performed by: STUDENT IN AN ORGANIZED HEALTH CARE EDUCATION/TRAINING PROGRAM

## 2024-06-07 PROCEDURE — 26508 RELEASE THUMB CONTRACTURE: CPT | Mod: AS,LT,,

## 2024-06-07 PROCEDURE — 37000009 HC ANESTHESIA EA ADD 15 MINS: Performed by: STUDENT IN AN ORGANIZED HEALTH CARE EDUCATION/TRAINING PROGRAM

## 2024-06-07 PROCEDURE — 87070 CULTURE OTHR SPECIMN AEROBIC: CPT | Performed by: STUDENT IN AN ORGANIZED HEALTH CARE EDUCATION/TRAINING PROGRAM

## 2024-06-07 PROCEDURE — 37000008 HC ANESTHESIA 1ST 15 MINUTES: Performed by: STUDENT IN AN ORGANIZED HEALTH CARE EDUCATION/TRAINING PROGRAM

## 2024-06-07 PROCEDURE — 86141 C-REACTIVE PROTEIN HS: CPT | Performed by: STUDENT IN AN ORGANIZED HEALTH CARE EDUCATION/TRAINING PROGRAM

## 2024-06-07 PROCEDURE — 85027 COMPLETE CBC AUTOMATED: CPT | Performed by: INTERNAL MEDICINE

## 2024-06-07 PROCEDURE — 63600175 PHARM REV CODE 636 W HCPCS: Mod: JZ,JG | Performed by: ANESTHESIOLOGY

## 2024-06-07 PROCEDURE — 36000706: Performed by: STUDENT IN AN ORGANIZED HEALTH CARE EDUCATION/TRAINING PROGRAM

## 2024-06-07 PROCEDURE — 80202 ASSAY OF VANCOMYCIN: CPT | Performed by: INTERNAL MEDICINE

## 2024-06-07 PROCEDURE — 85652 RBC SED RATE AUTOMATED: CPT | Performed by: STUDENT IN AN ORGANIZED HEALTH CARE EDUCATION/TRAINING PROGRAM

## 2024-06-07 PROCEDURE — 25000003 PHARM REV CODE 250: Performed by: INTERNAL MEDICINE

## 2024-06-07 PROCEDURE — 63600175 PHARM REV CODE 636 W HCPCS: Performed by: NURSE ANESTHETIST, CERTIFIED REGISTERED

## 2024-06-07 PROCEDURE — 11000001 HC ACUTE MED/SURG PRIVATE ROOM

## 2024-06-07 RX ORDER — ACETAMINOPHEN 10 MG/ML
1000 INJECTION, SOLUTION INTRAVENOUS ONCE
Status: COMPLETED | OUTPATIENT
Start: 2024-06-07 | End: 2024-06-07

## 2024-06-07 RX ORDER — MEPERIDINE HYDROCHLORIDE 25 MG/ML
12.5 INJECTION INTRAMUSCULAR; INTRAVENOUS; SUBCUTANEOUS ONCE AS NEEDED
Status: ACTIVE | OUTPATIENT
Start: 2024-06-07 | End: 2024-06-08

## 2024-06-07 RX ORDER — DEXAMETHASONE SODIUM PHOSPHATE 4 MG/ML
INJECTION, SOLUTION INTRA-ARTICULAR; INTRALESIONAL; INTRAMUSCULAR; INTRAVENOUS; SOFT TISSUE
Status: DISCONTINUED | OUTPATIENT
Start: 2024-06-07 | End: 2024-06-07

## 2024-06-07 RX ORDER — MIDAZOLAM HYDROCHLORIDE 1 MG/ML
INJECTION INTRAMUSCULAR; INTRAVENOUS
Status: DISCONTINUED | OUTPATIENT
Start: 2024-06-07 | End: 2024-06-07

## 2024-06-07 RX ORDER — GLYCOPYRROLATE 0.2 MG/ML
INJECTION INTRAMUSCULAR; INTRAVENOUS
Status: DISCONTINUED | OUTPATIENT
Start: 2024-06-07 | End: 2024-06-07

## 2024-06-07 RX ORDER — SODIUM CITRATE AND CITRIC ACID MONOHYDRATE 334; 500 MG/5ML; MG/5ML
30 SOLUTION ORAL
OUTPATIENT
Start: 2024-06-07

## 2024-06-07 RX ORDER — ONDANSETRON 4 MG/1
4 TABLET, ORALLY DISINTEGRATING ORAL EVERY 6 HOURS PRN
OUTPATIENT
Start: 2024-06-07

## 2024-06-07 RX ORDER — SODIUM CITRATE AND CITRIC ACID MONOHYDRATE 334; 500 MG/5ML; MG/5ML
30 SOLUTION ORAL ONCE
Status: COMPLETED | OUTPATIENT
Start: 2024-06-07 | End: 2024-06-07

## 2024-06-07 RX ORDER — HYDROMORPHONE HYDROCHLORIDE 2 MG/ML
0.2 INJECTION, SOLUTION INTRAMUSCULAR; INTRAVENOUS; SUBCUTANEOUS EVERY 5 MIN PRN
Status: DISCONTINUED | OUTPATIENT
Start: 2024-06-07 | End: 2024-06-09 | Stop reason: HOSPADM

## 2024-06-07 RX ORDER — DIPHENOXYLATE HYDROCHLORIDE AND ATROPINE SULFATE 2.5; .025 MG/1; MG/1
1 TABLET ORAL 4 TIMES DAILY PRN
Status: DISCONTINUED | OUTPATIENT
Start: 2024-06-07 | End: 2024-06-09 | Stop reason: HOSPADM

## 2024-06-07 RX ORDER — SODIUM CHLORIDE, SODIUM LACTATE, POTASSIUM CHLORIDE, CALCIUM CHLORIDE 600; 310; 30; 20 MG/100ML; MG/100ML; MG/100ML; MG/100ML
INJECTION, SOLUTION INTRAVENOUS CONTINUOUS
OUTPATIENT
Start: 2024-06-07

## 2024-06-07 RX ORDER — MIDAZOLAM HYDROCHLORIDE 2 MG/2ML
2 INJECTION, SOLUTION INTRAMUSCULAR; INTRAVENOUS ONCE AS NEEDED
OUTPATIENT
Start: 2024-06-07 | End: 2035-11-04

## 2024-06-07 RX ORDER — PROPOFOL 10 MG/ML
INJECTION, EMULSION INTRAVENOUS
Status: DISCONTINUED | OUTPATIENT
Start: 2024-06-07 | End: 2024-06-07

## 2024-06-07 RX ORDER — BUPIVACAINE HYDROCHLORIDE 5 MG/ML
INJECTION, SOLUTION EPIDURAL; INTRACAUDAL
Status: DISPENSED
Start: 2024-06-07 | End: 2024-06-08

## 2024-06-07 RX ORDER — SODIUM CITRATE AND CITRIC ACID MONOHYDRATE 334; 500 MG/5ML; MG/5ML
SOLUTION ORAL
Status: DISPENSED
Start: 2024-06-07 | End: 2024-06-08

## 2024-06-07 RX ORDER — LIDOCAINE HYDROCHLORIDE 10 MG/ML
1 INJECTION, SOLUTION EPIDURAL; INFILTRATION; INTRACAUDAL; PERINEURAL ONCE
OUTPATIENT
Start: 2024-06-07 | End: 2024-06-07

## 2024-06-07 RX ORDER — ONDANSETRON HYDROCHLORIDE 2 MG/ML
INJECTION, SOLUTION INTRAVENOUS
Status: DISCONTINUED | OUTPATIENT
Start: 2024-06-07 | End: 2024-06-07

## 2024-06-07 RX ORDER — LIDOCAINE HYDROCHLORIDE 20 MG/ML
INJECTION, SOLUTION EPIDURAL; INFILTRATION; INTRACAUDAL; PERINEURAL
Status: DISCONTINUED | OUTPATIENT
Start: 2024-06-07 | End: 2024-06-07

## 2024-06-07 RX ADMIN — SODIUM CHLORIDE 1000 ML: 9 INJECTION, SOLUTION INTRAVENOUS at 07:06

## 2024-06-07 RX ADMIN — HYDROCODONE BITARTRATE AND ACETAMINOPHEN 1 TABLET: 5; 325 TABLET ORAL at 08:06

## 2024-06-07 RX ADMIN — DEXAMETHASONE SODIUM PHOSPHATE 4 MG: 4 INJECTION, SOLUTION INTRA-ARTICULAR; INTRALESIONAL; INTRAMUSCULAR; INTRAVENOUS; SOFT TISSUE at 03:06

## 2024-06-07 RX ADMIN — PROPOFOL 200 MG: 10 INJECTION, EMULSION INTRAVENOUS at 03:06

## 2024-06-07 RX ADMIN — DIPHENOXYLATE HYDROCHLORIDE AND ATROPINE SULFATE 1 TABLET: 2.5; .025 TABLET ORAL at 08:06

## 2024-06-07 RX ADMIN — PIPERACILLIN SODIUM AND TAZOBACTAM SODIUM 4.5 G: 4; .5 INJECTION, POWDER, LYOPHILIZED, FOR SOLUTION INTRAVENOUS at 05:06

## 2024-06-07 RX ADMIN — MORPHINE SULFATE 4 MG: 4 INJECTION INTRAVENOUS at 05:06

## 2024-06-07 RX ADMIN — ACETAMINOPHEN 1000 MG: 10 INJECTION INTRAVENOUS at 03:06

## 2024-06-07 RX ADMIN — PIPERACILLIN SODIUM AND TAZOBACTAM SODIUM 4.5 G: 4; .5 INJECTION, POWDER, LYOPHILIZED, FOR SOLUTION INTRAVENOUS at 08:06

## 2024-06-07 RX ADMIN — ONDANSETRON HYDROCHLORIDE 4 MG: 2 SOLUTION INTRAMUSCULAR; INTRAVENOUS at 03:06

## 2024-06-07 RX ADMIN — SODIUM CHLORIDE, SODIUM GLUCONATE, SODIUM ACETATE, POTASSIUM CHLORIDE AND MAGNESIUM CHLORIDE: 526; 502; 368; 37; 30 INJECTION, SOLUTION INTRAVENOUS at 02:06

## 2024-06-07 RX ADMIN — ACETAMINOPHEN 650 MG: 325 TABLET ORAL at 12:06

## 2024-06-07 RX ADMIN — PIPERACILLIN SODIUM AND TAZOBACTAM SODIUM 4.5 G: 4; .5 INJECTION, POWDER, LYOPHILIZED, FOR SOLUTION INTRAVENOUS at 12:06

## 2024-06-07 RX ADMIN — SODIUM CITRATE AND CITRIC ACID MONOHYDRATE 30 ML: 500; 334 SOLUTION ORAL at 02:06

## 2024-06-07 RX ADMIN — HYDROCODONE BITARTRATE AND ACETAMINOPHEN 1 TABLET: 5; 325 TABLET ORAL at 04:06

## 2024-06-07 RX ADMIN — MIDAZOLAM 2 MG: 1 INJECTION INTRAMUSCULAR; INTRAVENOUS at 02:06

## 2024-06-07 RX ADMIN — GLYCOPYRROLATE 0.1 MG: 0.2 INJECTION INTRAMUSCULAR; INTRAVENOUS at 02:06

## 2024-06-07 RX ADMIN — DEXTROSE 2 G: 50 INJECTION, SOLUTION INTRAVENOUS at 03:06

## 2024-06-07 RX ADMIN — LIDOCAINE HYDROCHLORIDE 50 MG: 20 INJECTION, SOLUTION EPIDURAL; INFILTRATION; INTRACAUDAL; PERINEURAL at 03:06

## 2024-06-07 NOTE — PLAN OF CARE
Problem: Adult Inpatient Plan of Care  Goal: Plan of Care Review  6/7/2024 0916 by Sheila Sellers LPN  Outcome: Progressing  6/7/2024 0916 by Sheila Sellers LPN  Outcome: Progressing  Goal: Patient-Specific Goal (Individualized)  6/7/2024 0916 by Sheila Sellers LPN  Outcome: Progressing  6/7/2024 0916 by Sheila Sellers LPN  Outcome: Progressing  Goal: Absence of Hospital-Acquired Illness or Injury  6/7/2024 0916 by Sheila Sellers LPN  Outcome: Progressing  6/7/2024 0916 by Sheila Sellers LPN  Outcome: Progressing  Goal: Optimal Comfort and Wellbeing  6/7/2024 0916 by Sheila Sellers LPN  Outcome: Progressing  6/7/2024 0916 by Sheila Sellers LPN  Outcome: Progressing  Goal: Readiness for Transition of Care  6/7/2024 0916 by Sheila Sellers LPN  Outcome: Progressing  6/7/2024 0916 by Sheila Sellers LPN  Outcome: Progressing     Problem: Wound  Goal: Optimal Coping  6/7/2024 0916 by Sheila Sellers LPN  Outcome: Progressing  6/7/2024 0916 by Sheila Sellers LPN  Outcome: Progressing  Goal: Optimal Functional Ability  6/7/2024 0916 by Sheila Sellers LPN  Outcome: Progressing  6/7/2024 0916 by Sheila Sellers LPN  Outcome: Progressing  Goal: Absence of Infection Signs and Symptoms  6/7/2024 0916 by Sheila Sellers LPN  Outcome: Progressing  6/7/2024 0916 by Sheila Sellers LPN  Outcome: Progressing  Goal: Improved Oral Intake  6/7/2024 0916 by Sheila Sellers LPN  Outcome: Progressing  6/7/2024 0916 by Sheila Sellers LPN  Outcome: Progressing  Goal: Optimal Pain Control and Function  Outcome: Progressing  Goal: Skin Health and Integrity  Outcome: Progressing  Goal: Optimal Wound Healing  Outcome: Progressing

## 2024-06-07 NOTE — ANESTHESIA POSTPROCEDURE EVALUATION
98Anesthesia Post Evaluation    Patient: Todd Garza    Procedure(s) Performed: Procedure(s) (LRB):  IRRIGATION AND DEBRIDEMENT, UPPER EXTREMITY (Left)    Final Anesthesia Type: general (/Regional//MAC)      Patient location during evaluation: PACU  Post-procedure mental status: @ basline.  Pain management: adequate    PONV status: See postop meds for drugs used to control n/v if any.  Anesthetic complications: no      Cardiovascular status: blood pressure returned to baseline  Respiratory status: @ baseline.  Hydration status: euvolemic                Vitals Value Taken Time   /79 06/07/24 1601   Temp 98 06/07/24 1605   Pulse 90 06/07/24 1603   Resp 23 06/07/24 1603   SpO2 96 % 06/07/24 1602   Vitals shown include unfiled device data.      Event Time   Out of Recovery 06/07/2024 16:04:23         Pain/Yaritza Score: Pain Rating Prior to Med Admin: 5 (6/7/2024  3:48 PM)  Pain Rating Post Med Admin: 0 (6/6/2024  7:53 PM)  Yaritza Score: 10 (6/7/2024  4:04 PM)

## 2024-06-07 NOTE — PLAN OF CARE
06/07/24 1019   Discharge Assessment   Assessment Type Discharge Planning Assessment   Source of Information patient; utilized   Communicated NIKITA with patient/caregiver Yes   Reason For Admission HAND INFECTION/ ABSCESS   People in Home friend(s)   Do you expect to return to your current living situation? Yes   Do you have help at home or someone to help you manage your care at home? Yes   Who are your caregiver(s) and their phone number(s)? friend & friend wife   Prior to hospitilization cognitive status: Alert/Oriented   Current cognitive status: Alert/Oriented   Walking or Climbing Stairs Difficulty no   Dressing/Bathing Difficulty no   Equipment Currently Used at Home none   Readmission within 30 days? No   Patient currently being followed by outpatient case management? No   Do you currently have service(s) that help you manage your care at home? No   Do you take prescription medications? No   Do you have prescription coverage? No   Do you have any problems affording any of your prescribed medications? Yes   How do you get to doctors appointments? family or friend will provide   Are you on dialysis? No   Do you take coumadin? No   Discharge Plan A Home with family   Discharge Plan B Home with family   DME Needed Upon Discharge  none   Discharge Plan discussed with: Patient   Transition of Care Barriers Unable to afford medication;Underinsured     Admitted w hand infection- failed outpt therapy.  Scheduled for I&D today per Dr Lazar.  Sharri w pt ( using  phone- Irish speaking)-- std he lives with friend & friend's wife ( Misa)  Uninsured.  They will asst w recovery. No hh. No dme. Will f/u postop.     Contact # Misa [phone]

## 2024-06-07 NOTE — OP NOTE
Operative Note    Patient Information:  Todd Garza    Date of Surgery:  06/07/2024    Surgeon:  Guicho Lazar MD    Assistant:  Dara Murillo PA-C  who was necessary and essential for all aspects of the operation, including but not limited to patient positioning, surgical exposure, wound closure, and dressing placement.        Pre-operative Diagnosis:  Left hand infection    Post-operative Diagnosis:  Left hand cellulitis    Procedure Performed:  Left Thenar compartment release CPT 27478  Left thumb A1 pulley release CPT 30718    Anesthesia:  General    Complications:  None    Blood Loss:  See anesthesia record    Specimens:  Left hand tissue culture    Implants:  * No implants in log *     Indications for Procedure:  Todd Garza is a 23 y.o. male that presented with a infection of the left hand.A trial of oral antibiotics with the attempted which failed.  A bedside I and D was performed by the emergencym physician however symptoms continued to worsen.  The point of maximal tenderness was over the thenar compartment of his left hand.  His C-reactive protein was 128.  The decision was made to take him to the operating room for incision and drainage of his left hand specifically the thenar compartment.      Risks and benefits of the procedure were discussed with the patient. I explained that surgery and the nature of their condition are not without risks. These include, but are not limited to, bleeding, infection, neurovascular compromise, wound complications, scarring, cosmetic defects, need for later and/or repeated surgeries, pain, loss of ROM, loss of function, deformity, functional abnormalities, stiffness, thromboembolic complications, compartment syndrome, loss of limb, loss of life, anesthetic complications, and other imponderables. I explained that these can occur despite the adequacy of treatments rendered, and that their risks are heightened given the nature of their  condition. They verbalized understanding. They would like to continue with surgery at this time. If appropriate family was involved with surgical discussion. The patient expressed understanding of and agreement with the plan. Informed consent was obtained and signed prior going to the operative room.    Procedure in Detail:  Patient was brought to the operating room by the anesthesia team. Anesthesia was administered per the anesthesia record. The patient was left on the stretcher and a hand table was placed on the side of the bed.     Time out was performed and all parties present agreed with correct patient, correct procedure, correct side, correct site. The operative extremity was prepped and draped in standard normal fashion.     Pre-incision antibiotics were administered prior to skin incision. The tourniquet was inflated to 250mm HG.     A Brunner incision was made over the thumb proximal phalanx extending down into the thenar compartment.  Skin flaps were elevated.  Both the radial and ulnar digital nerves were identified and protected.  The thenar fascia was incised and the muscle was examined.  There was no pus in this thenar compartment.  The A1 pulley was identified and released in the flexor tendon sheath was examined.  There was no pus in the thumb flexor tendon sheath.  The wound was copiously irrigated with normal saline.  The wounds were closed with 4-0 nylon suture.  A sterile dressing was applied.      Tourniquet was deflated. Patient was awoken from anesthesia without complications and transferred to the recovery room in stable condition.       Post-operative Plan:  Patient will return to the floor where he will continue IV antibiotics and we will monitor for clinical improvement.

## 2024-06-07 NOTE — ANESTHESIA PREPROCEDURE EVALUATION
06/07/2024  Todd Garza is a 23 y.o., male.      Pre-op Assessment    I have reviewed the Patient Summary Reports.     I have reviewed the Nursing Notes. I have reviewed the NPO Status.   I have reviewed the Medications.     Review of Systems  Anesthesia Hx:  No problems with previous Anesthesia                Cardiovascular:  Exercise tolerance: good                                               Physical Exam  General: Well nourished and Cooperative    Airway:  Mallampati: II   Mouth Opening: Normal  TM Distance: Normal  Tongue: Normal  Neck ROM: Normal ROM    Dental:  Intact    Chest/Lungs:  Clear to auscultation    Heart:  Rhythm: Regular Rhythm        Anesthesia Plan  Type of Anesthesia, risks & benefits discussed:    Anesthesia Type: Gen Supraglottic Airway  Intra-op Monitoring Plan: Standard ASA Monitors  Post Op Pain Control Plan: multimodal analgesia  Induction:  IV  Informed Consent: Informed consent signed with the Patient and all parties understand the risks and agree with anesthesia plan.  All questions answered.   ASA Score: 1  Day of Surgery Review of History & Physical: H&P Update referred to the surgeon/provider.    Ready For Surgery From Anesthesia Perspective.     .I explained anesthesia plan to patient/responsbile party if available.  Anesthesia consent done going over the material facts, risks, complications & alternatives, obtained which includes the possibility of altering the anesthesia plan.  I reviewed problem list, prior to admission medication list, appropriate labs, any workup, Xray, EKG etc noted below.  Patients condition is satisfactory to proceed with anesthesia plan unless otherwise noted (see anesthesia chart for details of the anesthesia plan carried out).      Pre-operative evaluation for Procedure(s) (LRB):  IRRIGATION AND DEBRIDEMENT, UPPER EXTREMITY  "(Left)    /65   Pulse 84   Temp 36.6 °C (97.8 °F) (Oral)   Resp 18   Ht 5' 6" (1.676 m)   Wt 78 kg (172 lb)   SpO2 100%   BMI 27.76 kg/m²     Patient Active Problem List   Diagnosis    Abscess of left hand       Review of patient's allergies indicates:  No Known Allergies    Current Outpatient Medications   Medication Instructions    clindamycin (CLEOCIN) 300 mg, Oral, Every 6 hours    HYDROcodone-acetaminophen (NORCO) 5-325 mg per tablet 1 tablet, Oral, Every 6 hours PRN       Past Surgical History:   Procedure Laterality Date    none         Social History     Socioeconomic History    Marital status: Single   Tobacco Use    Smoking status: Never    Smokeless tobacco: Never   Substance and Sexual Activity    Alcohol use: Never    Drug use: Never       Lab Results   Component Value Date    WBC 10.59 06/07/2024    HGB 14.6 06/07/2024    HCT 43.2 06/07/2024    MCV 80.4 06/07/2024     06/07/2024          BMP  Lab Results   Component Value Date    HCT 43.2 06/07/2024     06/07/2024    K 4.0 06/07/2024    BUN 27.5 (H) 06/07/2024    CREATININE 2.33 (H) 06/07/2024    CALCIUM 9.4 06/07/2024        INR  No results for input(s): "PT", "INR", "PROTIME", "APTT" in the last 72 hours.        Diagnostic Studies:      EKG:  Results for orders placed or performed during the hospital encounter of 06/05/24   EKG 12-lead    Collection Time: 06/05/24 10:15 AM   Result Value Ref Range    QRS Duration 88 ms    OHS QTC Calculation 426 ms    Narrative    Test Reason : R55,    Vent. Rate : 086 BPM     Atrial Rate : 086 BPM     P-R Int : 150 ms          QRS Dur : 088 ms      QT Int : 356 ms       P-R-T Axes : 080 112 021 degrees     QTc Int : 426 ms    Normal sinus rhythm  Right axis deviation  Possible Right ventricular hypertrophy  Abnormal ECG  No previous ECGs available  Confirmed by Sj Lees MD ([id number]) on 6/5/2024 1:36:32 PM    Referred By: [email or web address]   SELF           Confirmed By:Sj BEDOYA" Job ALFARO

## 2024-06-07 NOTE — TRANSFER OF CARE
"Anesthesia Transfer of Care Note    Patient: Todd Garza    Procedure(s) Performed: Procedure(s) (LRB):  IRRIGATION AND DEBRIDEMENT, UPPER EXTREMITY (Left)    Patient location: PACU    Anesthesia Type: general    Transport from OR: Transported from OR on room air with adequate spontaneous ventilation    Post pain: adequate analgesia    Post assessment: no apparent anesthetic complications    Post vital signs: stable    Level of consciousness: sedated    Nausea/Vomiting: no nausea/vomiting    Complications: none    Transfer of care protocol was followed      Last vitals: Visit Vitals  /65   Pulse 84   Temp 36.8 °C (98.3 °F) (Oral)   Resp 18   Ht 5' 6" (1.676 m)   Wt 78 kg (172 lb)   SpO2 100%   BMI 27.76 kg/m²     "

## 2024-06-07 NOTE — CONSULTS
Chief Complaint:  Left hand infection    Consulting Physician: Self, Aaareferral    History of present illness:    Patient is a 23-year-old Setswana-speaking male who presents with a left hand infection.  He had a puncture wound to this left thumb about 2 weeks ago.  Over the last several days he was noticed redness pain and swelling of the left hand over the thenar compartment.  He states the pain is severe.  No fever or chills.  He was seen in the emergency department on 06/05/2024 where he was discharged on oral medication.  He presented back to the emergency department yesterday because his pain was worsening.  A small incision and drainage was performed in the emergency department and he was admitted to the floor for IV antibiotics.  He states that he continues to have pain in his hand.  The pain is localized over the thenar area.    Past Medical History:   Diagnosis Date    Known health problems: none        Past Surgical History:   Procedure Laterality Date    none         Current Facility-Administered Medications   Medication    acetaminophen tablet 650 mg    diphenoxylate-atropine 2.5-0.025 mg per tablet 1 tablet    HYDROcodone-acetaminophen 5-325 mg per tablet 1 tablet    ibuprofen tablet 400 mg    melatonin tablet 6 mg    morphine injection 4 mg    ondansetron injection 4 mg    piperacillin-tazobactam (ZOSYN) 4.5 g in dextrose 5 % in water (D5W) 100 mL IVPB (MB+)    sodium chloride 0.9% flush 10 mL       Review of patient's allergies indicates:  No Known Allergies    Family History   Problem Relation Name Age of Onset    No Known Problems Mother      No Known Problems Father         Social History     Socioeconomic History    Marital status: Single   Tobacco Use    Smoking status: Never    Smokeless tobacco: Never   Substance and Sexual Activity    Alcohol use: Never    Drug use: Never       Review of Systems:    Constitution:   Denies chills, fever, and sweats.  HENT:   Denies headaches or blurry  vision.  Cardiovascular:  Denies chest pain or irregular heart beat.  Respiratory:   Denies cough or shortness of breath.  Gastrointestinal:  Denies abdominal pain, nausea, or vomiting.  Musculoskeletal:   Denies muscle cramps.  Neurological:   Denies dizziness or focal weakness.  Psychiatric/Behavior: Normal mental status.  Hematology/Lymph:  Denies bleeding problem or easy bruising/bleeding.  Skin:    Denies rash or suspicious lesions.    Examination:    Vital Signs:    Vitals:    06/06/24 2003 06/06/24 2329 06/07/24 0501 06/07/24 0721   BP: (!) 144/76 121/69 121/76 111/68   Pulse: 106 101 93 83   Resp: 20 20 18 18   Temp: 100.1 °F (37.8 °C) 99.4 °F (37.4 °C) 98.1 °F (36.7 °C) 98.2 °F (36.8 °C)   TempSrc: Oral Oral Oral Oral   SpO2: 98% 99% 97% 97%   Weight:       Height:           Body mass index is 27.76 kg/m².    Constitution:   Well-developed, well nourished patient in no acute distress.  Neurological:   Alert and oriented x 3 and cooperative to examination.     Psychiatric/Behavior: Normal mental status.  Respiratory:   No shortness of breath.  Eyes:    Extraoccular muscles intact  Skin:    No scars, rash or suspicious lesions.    MSK:   Left hand:  Swelling over the thenar eminence.  The thenar eminences firm and tender to palpation.  It is erythematous and warm.  The hypothenar eminence is nontender non swollen non erythematous.  There is no tenderness to palpation tracking into the digits were into the forearm.  It is localized to the thenar eminence.  He is neurovascularly intact in the hand is warm well perfused    Imaging:   CT scan of the left hand without contrast shows soft tissue swelling over the thenar eminence with a questionable foreign body in the dorsal subcutaneous fat adjacent to the distal shaft of the 2nd metacarpal.    ESR and CRP are pending     Assessment:  Left hand cellulitis with possible deep space infection    Plan:  It is difficult to tell whether he has a deep space infection or  not he definitely has some component of cellulitis that is causing a significant amount of pain however the swelling over the thenar eminence is concerning for deep space infection.  The CT scan does not show significant amount of fluid that is deep in the thenar compartment however I think he would benefit from an incision and drainage based on my physical examination and the patient's worsening symptoms despite trying antibiotics alone.  I will take him to the operating room today 06/07/2024 for incision and drainage of the left hand.  He will then return to the floor for continued IV antibiotics.  I will get cultures intraoperatively.    I explained that there are risks of the operation including worsening infection, need for soft tissue coverage, amputation, sepsis, death.  He understands and agrees to proceeding with surgery

## 2024-06-07 NOTE — INTERVAL H&P NOTE
The patient has been examined and the H&P has been reviewed:    I concur with the findings and no changes have occurred since H&P was written.    Surgery risks, benefits and alternative options discussed and understood by patient/family.          Active Hospital Problems    Diagnosis  POA    *Abscess of left hand [L02.512]  Yes      Resolved Hospital Problems   No resolved problems to display.

## 2024-06-07 NOTE — H&P (VIEW-ONLY)
Chief Complaint:  Left hand infection    Consulting Physician: Self, Aaareferral    History of present illness:    Patient is a 23-year-old Korean-speaking male who presents with a left hand infection.  He had a puncture wound to this left thumb about 2 weeks ago.  Over the last several days he was noticed redness pain and swelling of the left hand over the thenar compartment.  He states the pain is severe.  No fever or chills.  He was seen in the emergency department on 06/05/2024 where he was discharged on oral medication.  He presented back to the emergency department yesterday because his pain was worsening.  A small incision and drainage was performed in the emergency department and he was admitted to the floor for IV antibiotics.  He states that he continues to have pain in his hand.  The pain is localized over the thenar area.    Past Medical History:   Diagnosis Date    Known health problems: none        Past Surgical History:   Procedure Laterality Date    none         Current Facility-Administered Medications   Medication    acetaminophen tablet 650 mg    diphenoxylate-atropine 2.5-0.025 mg per tablet 1 tablet    HYDROcodone-acetaminophen 5-325 mg per tablet 1 tablet    ibuprofen tablet 400 mg    melatonin tablet 6 mg    morphine injection 4 mg    ondansetron injection 4 mg    piperacillin-tazobactam (ZOSYN) 4.5 g in dextrose 5 % in water (D5W) 100 mL IVPB (MB+)    sodium chloride 0.9% flush 10 mL       Review of patient's allergies indicates:  No Known Allergies    Family History   Problem Relation Name Age of Onset    No Known Problems Mother      No Known Problems Father         Social History     Socioeconomic History    Marital status: Single   Tobacco Use    Smoking status: Never    Smokeless tobacco: Never   Substance and Sexual Activity    Alcohol use: Never    Drug use: Never       Review of Systems:    Constitution:   Denies chills, fever, and sweats.  HENT:   Denies headaches or blurry  vision.  Cardiovascular:  Denies chest pain or irregular heart beat.  Respiratory:   Denies cough or shortness of breath.  Gastrointestinal:  Denies abdominal pain, nausea, or vomiting.  Musculoskeletal:   Denies muscle cramps.  Neurological:   Denies dizziness or focal weakness.  Psychiatric/Behavior: Normal mental status.  Hematology/Lymph:  Denies bleeding problem or easy bruising/bleeding.  Skin:    Denies rash or suspicious lesions.    Examination:    Vital Signs:    Vitals:    06/06/24 2003 06/06/24 2329 06/07/24 0501 06/07/24 0721   BP: (!) 144/76 121/69 121/76 111/68   Pulse: 106 101 93 83   Resp: 20 20 18 18   Temp: 100.1 °F (37.8 °C) 99.4 °F (37.4 °C) 98.1 °F (36.7 °C) 98.2 °F (36.8 °C)   TempSrc: Oral Oral Oral Oral   SpO2: 98% 99% 97% 97%   Weight:       Height:           Body mass index is 27.76 kg/m².    Constitution:   Well-developed, well nourished patient in no acute distress.  Neurological:   Alert and oriented x 3 and cooperative to examination.     Psychiatric/Behavior: Normal mental status.  Respiratory:   No shortness of breath.  Eyes:    Extraoccular muscles intact  Skin:    No scars, rash or suspicious lesions.    MSK:   Left hand:  Swelling over the thenar eminence.  The thenar eminences firm and tender to palpation.  It is erythematous and warm.  The hypothenar eminence is nontender non swollen non erythematous.  There is no tenderness to palpation tracking into the digits were into the forearm.  It is localized to the thenar eminence.  He is neurovascularly intact in the hand is warm well perfused    Imaging:   CT scan of the left hand without contrast shows soft tissue swelling over the thenar eminence with a questionable foreign body in the dorsal subcutaneous fat adjacent to the distal shaft of the 2nd metacarpal.    ESR and CRP are pending     Assessment:  Left hand cellulitis with possible deep space infection    Plan:  It is difficult to tell whether he has a deep space infection or  not he definitely has some component of cellulitis that is causing a significant amount of pain however the swelling over the thenar eminence is concerning for deep space infection.  The CT scan does not show significant amount of fluid that is deep in the thenar compartment however I think he would benefit from an incision and drainage based on my physical examination and the patient's worsening symptoms despite trying antibiotics alone.  I will take him to the operating room today 06/07/2024 for incision and drainage of the left hand.  He will then return to the floor for continued IV antibiotics.  I will get cultures intraoperatively.    I explained that there are risks of the operation including worsening infection, need for soft tissue coverage, amputation, sepsis, death.  He understands and agrees to proceeding with surgery

## 2024-06-07 NOTE — PROGRESS NOTES
[facility]  [facility] Medicine Progress Note      Patient Name: Todd Garza  MRN: [id number]  Admission Date: 6/6/2024   Length of Stay: 1  Attending Physician: Ollie Bolaños MD  Primary Care Provider: Tita, Primary Doctor  Face-to-Face encounter date: 06/07/2024    Code Status: Full Code        Chief Complaint:   Hand Pain (C/o left hand pain s/p possible abscess. Sent home with PO ABX and took one dose before coming to ED today )        HPI:   Todd Garza is a 23 y.o. male who  has a past medical history of Known health problems: none. The patient presented to [facility] on 6/6/2024 with a primary complaint of left hand infection. He was seen yesterday and was discharged on clindamycin. His symptoms persisted so he returned to ER today. An I&D was performed by the ER staff and a wound cx was sent. This infection started about 2 weeks ago due to a wood splinter. He denies any other trauma. PT is a Italian speaker, used translation service.      Overview/Hospital Course:  No notes on file       Interval Hx:   The patient went to the OR for washout of hand earlier today. The pt had 3 episodes of diarrhea this am. He reports none since then but he was npo for his surgery much of the day. He had a temp of 100.5 this afternoon.     Review of Systems   All other systems reviewed and are negative.      Objective/physical exam:  General: In no acute distress, afebrile  Chest: Clear to auscultation bilaterally  Heart: RRR, +S1, S2, no appreciable murmur  Abdomen: Soft, nontender, BS +  MSK: left hand bandaged  Neurologic: Alert and oriented x4  Psych/mental status: Appropriate mood and affect. Responds appropriately to questions.     VITAL SIGNS: 24 HRS MIN & MAX LAST   Temp  Min: 97.8 °F (36.6 °C)  Max: 100.5 °F (38.1 °C) 98.3 °F (36.8 °C)   BP  Min: 107/64  Max: 149/87 122/83   Pulse  Min: 72  Max: 106  80   Resp  Min: 14  Max: 27 16   SpO2   Min: 96 %  Max: 100 % 97 %       Recent Labs   Lab 06/05/24  1035 06/06/24  1101 06/07/24  0444   WBC 10.69 9.95 10.59   RBC 5.94 5.90 5.37   HGB 15.7 15.7 14.6   HCT 48.5 47.0 43.2   MCV 81.6 79.7* 80.4   MCH 26.4* 26.6* 27.2   MCHC 32.4* 33.4 33.8   RDW 14.0 14.1 13.9    347 281   MPV 8.9 9.0 9.4       Recent Labs   Lab 06/05/24  1035 06/06/24  1101 06/07/24  0444 06/07/24  1212    139 139 142   K 4.3 3.9 4.0 3.8    105 109* 113*   CO2 27 23 20* 21*   BUN 12.2 12.5 27.5* 25.4*   CREATININE 1.00 0.83 2.33* 2.39*   CALCIUM 9.3 9.8 9.4 8.8   ALBUMIN 4.4 4.2  --   --    ALKPHOS 117 116  --   --    ALT 23 19  --   --    AST 16 14  --   --    BILITOT 0.5 0.5  --   --         Microbiology Results (last 7 days)       Procedure Component Value Units Date/Time    Gram Stain [[id number]] Collected: 06/07/24 1523    Order Status: Sent Specimen: Wound from Thumb, Left Hand Updated: 06/07/24 1733    Wound Culture [[id number]] Collected: 06/07/24 1523    Order Status: Sent Specimen: Wound from Thumb, Left Hand Updated: 06/07/24 1733    Anaerobic Culture [[id number]] Collected: 06/07/24 1523    Order Status: Sent Specimen: Wound from Thumb, Left Hand Updated: 06/07/24 1733    Wound Culture [[id number]] Collected: 06/06/24 1426    Order Status: Completed Specimen: Abscess from Hand, Left Updated: 06/07/24 0647     Wound Culture No Growth At 24 Hours             Radiology:  CT Hand Without Contrast Left  Narrative: EXAMINATION:  CT HAND WITHOUT CONTRAST LEFT    CLINICAL HISTORY:  Soft tissue infection suspected, hand, xray done;Evaluate for foreign body left hand;    TECHNIQUE:  Helical acquisition through the left hand without IV contrast.  Three plane reconstructions were provided for review.  mGycm. Automatic exposure control, adjustment of mA/kV or iterative reconstruction technique was used to reduce radiation.    COMPARISON:  No priors    FINDINGS:  There is a 3-4 mm foreign body within the dorsal  subcutaneous fat adjacent to the distal shaft of the 2nd metacarpal.  No other foreign body is seen.  No fracture or dislocation.  No fluid collection seen.  Impression: Small foreign body as discussed.    Electronically signed by: Mick Hobson  Date:    06/05/2024  Time:    12:09      Scheduled Med:   BUPivacaine (PF) 0.5% (5 mg/mL)        piperacillin-tazobactam (Zosyn) IV (PEDS and ADULTS) (extended infusion is not appropriate)  4.5 g Intravenous Q8H    sodium citrate-citric acid 500-334 mg/5 ml                Nutrition Status:      Assessment/Plan:    Left hand abscess w/ suspected foreign body  S/p I&D  JOSIAH  Diarrhea      Plan  F/u surgical cultures  Vanc dc due to increase in CR  Cont ivf  Repeat labs in am  Cont zosyn            All diagnosis and differential diagnosis have been reviewed; assessment and plan has been documented; I have personally reviewed the labs and test results that are presently available; I have reviewed the patients medication list; I have reviewed the consulting providers response and recommendations. I have reviewed or attempted to review medical records based upon their availability      _____________________________________________________________________            Ollie Bolaños MD   06/07/2024

## 2024-06-07 NOTE — HPI
Todd Garza is a 23 y.o. male who  has a past medical history of Known health problems: none. The patient presented to [facility] on 6/6/2024 with a primary complaint of left hand infection. He was seen yesterday and was discharged on clindamycin. His symptoms persisted so he returned to ER today. An I&D was performed by the ER staff and a wound cx was sent. This infection started about 2 weeks ago due to a wood splinter. He denies any other trauma. PT is a Indonesian speaker, used translation service.

## 2024-06-08 LAB
ANION GAP SERPL CALC-SCNC: 10 MEQ/L
BUN SERPL-MCNC: 18.5 MG/DL (ref 8.9–20.6)
CALCIUM SERPL-MCNC: 8.8 MG/DL (ref 8.4–10.2)
CHLORIDE SERPL-SCNC: 112 MMOL/L (ref 98–107)
CO2 SERPL-SCNC: 21 MMOL/L (ref 22–29)
CREAT SERPL-MCNC: 2.09 MG/DL (ref 0.73–1.18)
CREAT/UREA NIT SERPL: 9
ERYTHROCYTE [DISTWIDTH] IN BLOOD BY AUTOMATED COUNT: 14 % (ref 11.5–17)
GFR SERPLBLD CREATININE-BSD FMLA CKD-EPI: 45 ML/MIN/1.73/M2
GLUCOSE SERPL-MCNC: 104 MG/DL (ref 74–100)
GRAM STN SPEC: NORMAL
GRAM STN SPEC: NORMAL
HCT VFR BLD AUTO: 41.1 % (ref 42–52)
HGB BLD-MCNC: 13.5 G/DL (ref 14–18)
MAGNESIUM SERPL-MCNC: 2.6 MG/DL (ref 1.6–2.6)
MCH RBC QN AUTO: 26.5 PG (ref 27–31)
MCHC RBC AUTO-ENTMCNC: 32.8 G/DL (ref 33–36)
MCV RBC AUTO: 80.7 FL (ref 80–94)
NRBC BLD AUTO-RTO: 0 %
PLATELET # BLD AUTO: 302 X10(3)/MCL (ref 130–400)
PMV BLD AUTO: 9.1 FL (ref 7.4–10.4)
POTASSIUM SERPL-SCNC: 4.1 MMOL/L (ref 3.5–5.1)
RBC # BLD AUTO: 5.09 X10(6)/MCL (ref 4.7–6.1)
SODIUM SERPL-SCNC: 143 MMOL/L (ref 136–145)
WBC # SPEC AUTO: 11.38 X10(3)/MCL (ref 4.5–11.5)

## 2024-06-08 PROCEDURE — 36415 COLL VENOUS BLD VENIPUNCTURE: CPT | Performed by: INTERNAL MEDICINE

## 2024-06-08 PROCEDURE — 85027 COMPLETE CBC AUTOMATED: CPT | Performed by: INTERNAL MEDICINE

## 2024-06-08 PROCEDURE — 25000003 PHARM REV CODE 250: Performed by: INTERNAL MEDICINE

## 2024-06-08 PROCEDURE — 80048 BASIC METABOLIC PNL TOTAL CA: CPT | Performed by: INTERNAL MEDICINE

## 2024-06-08 PROCEDURE — 63600175 PHARM REV CODE 636 W HCPCS: Performed by: INTERNAL MEDICINE

## 2024-06-08 PROCEDURE — 83735 ASSAY OF MAGNESIUM: CPT | Performed by: INTERNAL MEDICINE

## 2024-06-08 PROCEDURE — 11000001 HC ACUTE MED/SURG PRIVATE ROOM

## 2024-06-08 RX ORDER — SODIUM CHLORIDE 9 MG/ML
INJECTION, SOLUTION INTRAVENOUS CONTINUOUS
Status: DISCONTINUED | OUTPATIENT
Start: 2024-06-08 | End: 2024-06-09 | Stop reason: HOSPADM

## 2024-06-08 RX ADMIN — PIPERACILLIN SODIUM AND TAZOBACTAM SODIUM 4.5 G: 4; .5 INJECTION, POWDER, LYOPHILIZED, FOR SOLUTION INTRAVENOUS at 04:06

## 2024-06-08 RX ADMIN — SODIUM CHLORIDE: 9 INJECTION, SOLUTION INTRAVENOUS at 11:06

## 2024-06-08 RX ADMIN — HYDROCODONE BITARTRATE AND ACETAMINOPHEN 1 TABLET: 5; 325 TABLET ORAL at 09:06

## 2024-06-08 RX ADMIN — HYDROCODONE BITARTRATE AND ACETAMINOPHEN 1 TABLET: 5; 325 TABLET ORAL at 11:06

## 2024-06-08 RX ADMIN — VANCOMYCIN HYDROCHLORIDE 1500 MG: 1.5 INJECTION, POWDER, LYOPHILIZED, FOR SOLUTION INTRAVENOUS at 01:06

## 2024-06-08 RX ADMIN — SODIUM CHLORIDE: 9 INJECTION, SOLUTION INTRAVENOUS at 03:06

## 2024-06-08 RX ADMIN — HYDROCODONE BITARTRATE AND ACETAMINOPHEN 1 TABLET: 5; 325 TABLET ORAL at 03:06

## 2024-06-08 NOTE — PROGRESS NOTES
[facility] - [facility]opaedics  Orthopedics  Progress Note    Patient Name: Todd Garza  MRN: [id number]  Admission Date: 6/6/2024  Hospital Length of Stay: 2 days  Attending Provider: Ollie Bolaños MD  Primary Care Provider: Tita, Primary Doctor  Follow-up For: Procedure(s) (LRB):  IRRIGATION AND DEBRIDEMENT, UPPER EXTREMITY (Left)    Post-Operative Day: 1 Day Post-Op  Subjective:     Principal Problem:Abscess of left hand    Principal Orthopedic Problem: 1 Day Post-Op     Interval History: Resting comfortably in bed    Review of patient's allergies indicates:  No Known Allergies    Current Facility-Administered Medications   Medication    acetaminophen tablet 650 mg    diphenoxylate-atropine 2.5-0.025 mg per tablet 1 tablet    HYDROcodone-acetaminophen 5-325 mg per tablet 1 tablet    HYDROmorphone (PF) injection 0.2 mg    ibuprofen tablet 400 mg    melatonin tablet 6 mg    meperidine (PF) injection 12.5 mg    morphine injection 4 mg    ondansetron injection 4 mg    piperacillin-tazobactam (ZOSYN) 4.5 g in dextrose 5 % in water (D5W) 100 mL IVPB (MB+)    sodium chloride 0.9% flush 10 mL     Objective:     Vital Signs (Most Recent):  Temp: 98 °F (36.7 °C) (06/08/24 0747)  Pulse: 67 (06/08/24 0747)  Resp: 17 (06/07/24 2039)  BP: 113/71 (06/08/24 0747)  SpO2: 98 % (06/08/24 0747) Vital Signs (24h Range):  Temp:  [97.8 °F (36.6 °C)-100.5 °F (38.1 °C)] 98 °F (36.7 °C)  Pulse:  [67-97] 67  Resp:  [14-27] 17  SpO2:  [95 %-100 %] 98 %  BP: (104-149)/(60-87) 113/71     Weight: 78 kg (172 lb)  Height: 5' 6" (167.6 cm)  Body mass index is 27.76 kg/m².      Intake/Output Summary (Last 24 hours) at 6/8/2024 0817  Last data filed at 6/8/2024 0506  Gross per 24 hour   Intake 2195.82 ml   Output --   Net 2195.82 ml       Physical Exam:   Musculoskeletal:     Left upper extremity: Dressing c/d/i; AIN/PIN/Ulnar motor intact; SILT distally;BCR distally; Radial pulse 2+    Diagnostic Findings: "   Significant Labs:   Recent Lab Results  (Last 5 results in the past 72 hours)        06/08/24  0631   06/08/24  0630   06/07/24  1523   06/07/24  1212   06/07/24  0444        Anion Gap 10.0       8.0   10.0       BUN 18.5       25.4  Comment: Please draw @ 1100   27.5       BUN/CREAT RATIO 9       11   12       Calcium 8.8       8.8  Comment: Please draw @ 1100   9.4       Chloride 112       113  Comment: Please draw @ 1100   109       CO2 21       21  Comment: Please draw @ 1100   20       Creatinine 2.09       2.39  Comment: Please draw @ 1100   2.33       CRP, High Sensitivity       128.14  Comment: Please draw @ 1100         Wound Culture     No Growth At 24 Hours  [P]           eGFR 45       38   39       Glucose 104       96  Comment: Please draw @ 1100   109       Hematocrit   41.1       43.2       Hemoglobin   13.5       14.6       Magnesium  2.60               MCH   26.5       27.2       MCHC   32.8       33.8       MCV   80.7       80.4       MPV   9.1       9.4       nRBC   0.0       0.0       Platelet Count   302       281       Potassium 4.1       3.8  Comment: Please draw @ 1100   4.0       RBC   5.09       5.37       RDW   14.0       13.9       Sed Rate       38         Sodium 143       142  Comment: Please draw @ 1100   139       Vancomycin-Trough       14.6  Comment: Please draw @ 1100         WBC   11.38       10.59                               [P] - Preliminary Result                Assessment/Plan:     Active Diagnoses:    Diagnosis Date Noted POA    PRINCIPAL PROBLEM:  Abscess of left hand [L02.512] 06/06/2024 Yes      Problems Resolved During this Admission:         PLAN:   6/7/2024: I&D Left Thumb  -continue abx  -f/u cultures  -dressing change tomorrow

## 2024-06-08 NOTE — PROGRESS NOTES
[facility]  [facility] Medicine Progress Note      Patient Name: Todd Garza  MRN: [id number]  Admission Date: 6/6/2024   Length of Stay: 2  Attending Physician: Ollie Bolaños MD  Primary Care Provider: Tita, Primary Doctor  Face-to-Face encounter date: 06/08/2024    Code Status: Full Code        Chief Complaint:   Hand Pain (C/o left hand pain s/p possible abscess. Sent home with PO ABX and took one dose before coming to ED today )        HPI:   Todd Garza is a 23 y.o. male who  has a past medical history of Known health problems: none. The patient presented to [facility] on 6/6/2024 with a primary complaint of left hand infection. He was seen yesterday and was discharged on clindamycin. His symptoms persisted so he returned to ER today. An I&D was performed by the ER staff and a wound cx was sent. This infection started about 2 weeks ago due to a wood splinter. He denies any other trauma. PT is a Omani speaker, used translation service.      Overview/Hospital Course:  No notes on file   S/p I&D of hand 6/7/24    Interval Hx:   The patient has no c/o, reports 1 small bm this am. No other c/o. His pain is controlled. Afebrile.    Review of Systems   All other systems reviewed and are negative.      Objective/physical exam:  General: In no acute distress, afebrile  Chest: Clear to auscultation bilaterally  Heart: RRR, +S1, S2, no appreciable murmur  Abdomen: Soft, nontender, BS +  MSK: left hand bandaged  Neurologic: Alert and oriented x4  Psych/mental status: Appropriate mood and affect. Responds appropriately to questions.     VITAL SIGNS: 24 HRS MIN & MAX LAST   Temp  Min: 98 °F (36.7 °C)  Max: 100.5 °F (38.1 °C) 98 °F (36.7 °C)   BP  Min: 104/65  Max: 149/87 113/71   Pulse  Min: 67  Max: 97  67   Resp  Min: 14  Max: 27 17   SpO2  Min: 95 %  Max: 99 % 98 %       Recent Labs   Lab 06/06/24  1101 06/07/24  0444 06/08/24  0630   WBC 9.95  10.59 11.38   RBC 5.90 5.37 5.09   HGB 15.7 14.6 13.5*   HCT 47.0 43.2 41.1*   MCV 79.7* 80.4 80.7   MCH 26.6* 27.2 26.5*   MCHC 33.4 33.8 32.8*   RDW 14.1 13.9 14.0    281 302   MPV 9.0 9.4 9.1       Recent Labs   Lab 06/05/24  1035 06/06/24  1101 06/07/24  0444 06/07/24  1212 06/08/24  0631    139 139 142 143   K 4.3 3.9 4.0 3.8 4.1    105 109* 113* 112*   CO2 27 23 20* 21* 21*   BUN 12.2 12.5 27.5* 25.4* 18.5   CREATININE 1.00 0.83 2.33* 2.39* 2.09*   CALCIUM 9.3 9.8 9.4 8.8 8.8   MG  --   --   --   --  2.60   ALBUMIN 4.4 4.2  --   --   --    ALKPHOS 117 116  --   --   --    ALT 23 19  --   --   --    AST 16 14  --   --   --    BILITOT 0.5 0.5  --   --   --         Microbiology Results (last 7 days)       Procedure Component Value Units Date/Time    Gram Stain [[id number]] Collected: 06/07/24 1523    Order Status: Completed Specimen: Wound from Thumb, Left Hand Updated: 06/08/24 1114     GRAM STAIN Rare WBC observed      No bacteria seen    Wound Culture [[id number]]  (Abnormal) Collected: 06/06/24 1426    Order Status: Completed Specimen: Abscess from Hand, Left Updated: 06/08/24 0712     Wound Culture Many Staphylococcus aureus    Wound Culture [[id number]] Collected: 06/07/24 1523    Order Status: Completed Specimen: Wound from Thumb, Left Hand Updated: 06/08/24 0634     Wound Culture No Growth At 24 Hours    Anaerobic Culture [[id number]] Collected: 06/07/24 1523    Order Status: Sent Specimen: Wound from Thumb, Left Hand Updated: 06/07/24 1733             Radiology:  CT Hand Without Contrast Left  Narrative: EXAMINATION:  CT HAND WITHOUT CONTRAST LEFT    CLINICAL HISTORY:  Soft tissue infection suspected, hand, xray done;Evaluate for foreign body left hand;    TECHNIQUE:  Helical acquisition through the left hand without IV contrast.  Three plane reconstructions were provided for review.  mGycm. Automatic exposure control, adjustment of mA/kV or iterative reconstruction technique  was used to reduce radiation.    COMPARISON:  No priors    FINDINGS:  There is a 3-4 mm foreign body within the dorsal subcutaneous fat adjacent to the distal shaft of the 2nd metacarpal.  No other foreign body is seen.  No fracture or dislocation.  No fluid collection seen.  Impression: Small foreign body as discussed.    Electronically signed by: Mick Hobson  Date:    06/05/2024  Time:    12:09      Scheduled Med:   vancomycin 1 gram in sodium chloride 500 mL flush  1,000 mg Irrigation Once            Nutrition Status:      Assessment/Plan:    Left hand abscess s/p I&D  Wound cx -Staph aureus  JOSIAH -improved  Diarrhea -resolved     Plan  F/u final culture results  Resume Vanc and dc zosyn  Cont ivf  Repeat labs in am  Possible dc tomorrow          All diagnosis and differential diagnosis have been reviewed; assessment and plan has been documented; I have personally reviewed the labs and test results that are presently available; I have reviewed the patients medication list; I have reviewed the consulting providers response and recommendations. I have reviewed or attempted to review medical records based upon their availability      _____________________________________________________________________            Ollie Bolaños MD   06/08/2024

## 2024-06-08 NOTE — PROGRESS NOTES
Pharmacokinetic Initial Assessment: IV Vancomycin    Assessment/Plan:    Initiate intravenous vancomycin with loading dose of 1500 mg once with subsequent doses when random concentrations are less than 20 mcg/mL  Desired empiric serum trough concentration is 10 to 20 mcg/mL  Draw vancomycin random level on 06/09 at 0430.  Pharmacy will continue to follow and monitor vancomycin.      Please contact pharmacy at extension [phone] with any questions regarding this assessment.     Thank you for the consult,   Catracho Nye       Patient brief summary:  Todd Garza is a 23 y.o. male initiated on antimicrobial therapy with IV Vancomycin for treatment of suspected skin & soft tissue infection    Drug Allergies:   Review of patient's allergies indicates:  No Known Allergies    Actual Body Weight:   78kg    Renal Function:   Estimated Creatinine Clearance: 53.6 mL/min (A) (based on SCr of 2.09 mg/dL (H)).,     Dialysis Method (if applicable):  N/A    CBC (last 72 hours):  Recent Labs   Lab Result Units 06/06/24  1101 06/07/24  0444 06/08/24  0630   WBC x10(3)/mcL 9.95 10.59 11.38   Hgb g/dL 15.7 14.6 13.5*   Hct % 47.0 43.2 41.1*   Platelet x10(3)/mcL 347 281 302   Mono % % 10.9  --   --    Eos % % 3.5  --   --    Basophil % % 0.3  --   --        Metabolic Panel (last 72 hours):  Recent Labs   Lab Result Units 06/06/24  1101 06/07/24  0444 06/07/24  1212 06/08/24  0631   Sodium mmol/L 139 139 142 143   Potassium mmol/L 3.9 4.0 3.8 4.1   Chloride mmol/L 105 109* 113* 112*   CO2 mmol/L 23 20* 21* 21*   Glucose mg/dL 86 109* 96 104*   Blood Urea Nitrogen mg/dL 12.5 27.5* 25.4* 18.5   Creatinine mg/dL 0.83 2.33* 2.39* 2.09*   Albumin g/dL 4.2  --   --   --    Bilirubin Total mg/dL 0.5  --   --   --    ALP unit/L 116  --   --   --    AST unit/L 14  --   --   --    ALT unit/L 19  --   --   --    Magnesium Level mg/dL  --   --   --  2.60       Drug levels (last 3 results):  Recent Labs   Lab Result Units 06/07/24  1212    Vancomycin Trough ug/ml 14.6*       Microbiologic Results:  Microbiology Results (last 7 days)       Procedure Component Value Units Date/Time    Gram Stain [[id number]] Collected: 06/07/24 1523    Order Status: Completed Specimen: Wound from Thumb, Left Hand Updated: 06/08/24 1114     GRAM STAIN Rare WBC observed      No bacteria seen    Wound Culture [[id number]]  (Abnormal) Collected: 06/06/24 1426    Order Status: Completed Specimen: Abscess from Hand, Left Updated: 06/08/24 0712     Wound Culture Many Staphylococcus aureus    Wound Culture [[id number]] Collected: 06/07/24 1523    Order Status: Completed Specimen: Wound from Thumb, Left Hand Updated: 06/08/24 0634     Wound Culture No Growth At 24 Hours    Anaerobic Culture [[id number]] Collected: 06/07/24 1523    Order Status: Sent Specimen: Wound from Thumb, Left Hand Updated: 06/07/24 1739

## 2024-06-09 VITALS
HEIGHT: 66 IN | SYSTOLIC BLOOD PRESSURE: 116 MMHG | WEIGHT: 172 LBS | BODY MASS INDEX: 27.64 KG/M2 | OXYGEN SATURATION: 98 % | TEMPERATURE: 98 F | RESPIRATION RATE: 18 BRPM | HEART RATE: 84 BPM | DIASTOLIC BLOOD PRESSURE: 73 MMHG

## 2024-06-09 LAB
ANION GAP SERPL CALC-SCNC: 8 MEQ/L
BACTERIA WND CULT: ABNORMAL
BUN SERPL-MCNC: 13.6 MG/DL (ref 8.9–20.6)
CALCIUM SERPL-MCNC: 9.4 MG/DL (ref 8.4–10.2)
CHLORIDE SERPL-SCNC: 113 MMOL/L (ref 98–107)
CO2 SERPL-SCNC: 22 MMOL/L (ref 22–29)
CREAT SERPL-MCNC: 1.65 MG/DL (ref 0.73–1.18)
CREAT/UREA NIT SERPL: 8
CRP SERPL HS-MCNC: 51.28 MG/L
ERYTHROCYTE [DISTWIDTH] IN BLOOD BY AUTOMATED COUNT: 14.4 % (ref 11.5–17)
GFR SERPLBLD CREATININE-BSD FMLA CKD-EPI: 59 ML/MIN/1.73/M2
GLUCOSE SERPL-MCNC: 88 MG/DL (ref 74–100)
HCT VFR BLD AUTO: 42.5 % (ref 42–52)
HGB BLD-MCNC: 13.6 G/DL (ref 14–18)
MCH RBC QN AUTO: 26.1 PG (ref 27–31)
MCHC RBC AUTO-ENTMCNC: 32 G/DL (ref 33–36)
MCV RBC AUTO: 81.6 FL (ref 80–94)
NRBC BLD AUTO-RTO: 0 %
PLATELET # BLD AUTO: 336 X10(3)/MCL (ref 130–400)
PMV BLD AUTO: 9.1 FL (ref 7.4–10.4)
POTASSIUM SERPL-SCNC: 4.2 MMOL/L (ref 3.5–5.1)
RBC # BLD AUTO: 5.21 X10(6)/MCL (ref 4.7–6.1)
SODIUM SERPL-SCNC: 143 MMOL/L (ref 136–145)
VANCOMYCIN SERPL-MCNC: 10.1 UG/ML (ref 15–20)
WBC # SPEC AUTO: 8.22 X10(3)/MCL (ref 4.5–11.5)

## 2024-06-09 PROCEDURE — 36415 COLL VENOUS BLD VENIPUNCTURE: CPT | Performed by: INTERNAL MEDICINE

## 2024-06-09 PROCEDURE — 86141 C-REACTIVE PROTEIN HS: CPT | Performed by: INTERNAL MEDICINE

## 2024-06-09 PROCEDURE — 63600175 PHARM REV CODE 636 W HCPCS: Performed by: INTERNAL MEDICINE

## 2024-06-09 PROCEDURE — 25000003 PHARM REV CODE 250: Performed by: INTERNAL MEDICINE

## 2024-06-09 PROCEDURE — 80202 ASSAY OF VANCOMYCIN: CPT | Performed by: INTERNAL MEDICINE

## 2024-06-09 PROCEDURE — 80048 BASIC METABOLIC PNL TOTAL CA: CPT | Performed by: INTERNAL MEDICINE

## 2024-06-09 PROCEDURE — 85027 COMPLETE CBC AUTOMATED: CPT | Performed by: INTERNAL MEDICINE

## 2024-06-09 RX ORDER — CLINDAMYCIN HYDROCHLORIDE 300 MG/1
300 CAPSULE ORAL EVERY 6 HOURS
Qty: 20 CAPSULE | Refills: 0 | Status: SHIPPED | OUTPATIENT
Start: 2024-06-09 | End: 2024-06-14

## 2024-06-09 RX ORDER — TRAMADOL HYDROCHLORIDE 50 MG/1
50 TABLET ORAL EVERY 6 HOURS PRN
Qty: 20 TABLET | Refills: 0 | Status: SHIPPED | OUTPATIENT
Start: 2024-06-09 | End: 2024-06-14

## 2024-06-09 RX ORDER — CLINDAMYCIN HYDROCHLORIDE 300 MG/1
300 CAPSULE ORAL EVERY 8 HOURS
Qty: 30 CAPSULE | Refills: 0 | Status: SHIPPED | OUTPATIENT
Start: 2024-06-09 | End: 2024-06-09

## 2024-06-09 RX ADMIN — VANCOMYCIN HYDROCHLORIDE 1250 MG: 1.25 INJECTION, POWDER, LYOPHILIZED, FOR SOLUTION INTRAVENOUS at 10:06

## 2024-06-09 RX ADMIN — HYDROCODONE BITARTRATE AND ACETAMINOPHEN 1 TABLET: 5; 325 TABLET ORAL at 06:06

## 2024-06-09 NOTE — PROGRESS NOTES
".[facility] - Orthopaedics  Orthopedics  Progress Note    Patient Name: Todd Garza  MRN: [id number]  Admission Date: 6/6/2024  Hospital Length of Stay: 3 days  Attending Provider: Ollie Bolaños MD  Primary Care Provider: Tita, Primary Doctor  Follow-up For: Procedure(s) (LRB):  IRRIGATION AND DEBRIDEMENT, UPPER EXTREMITY (Left)    Post-Operative Day: 2 Day Post-Op  Subjective:     Principal Problem:Abscess of left hand    Principal Orthopedic Problem: 2 Days Post-Op     Interval History: Resting comfortably in bed    Review of patient's allergies indicates:  No Known Allergies    Current Facility-Administered Medications   Medication    0.9%  NaCl infusion    acetaminophen tablet 650 mg    diphenoxylate-atropine 2.5-0.025 mg per tablet 1 tablet    HYDROcodone-acetaminophen 5-325 mg per tablet 1 tablet    HYDROmorphone (PF) injection 0.2 mg    ibuprofen tablet 400 mg    melatonin tablet 6 mg    morphine injection 4 mg    ondansetron injection 4 mg    sodium chloride 0.9% flush 10 mL    vancomycin - pharmacy to dose    vancomycin 1,250 mg in dextrose 5 % (D5W) 250 mL IVPB (Vial-Mate)     Objective:     Vital Signs (Most Recent):  Temp: 98.4 °F (36.9 °C) (06/09/24 0438)  Pulse: 82 (06/09/24 0438)  Resp: 18 (06/09/24 0658)  BP: 118/67 (06/09/24 0438)  SpO2: 99 % (06/09/24 0438) Vital Signs (24h Range):  Temp:  [97.6 °F (36.4 °C)-98.5 °F (36.9 °C)] 98.4 °F (36.9 °C)  Pulse:  [] 82  Resp:  [17-18] 18  SpO2:  [98 %-99 %] 99 %  BP: (117-146)/(67-80) 118/67     Weight: 78 kg (172 lb)  Height: 5' 6" (167.6 cm)  Body mass index is 27.76 kg/m².      Intake/Output Summary (Last 24 hours) at 6/9/2024 0909  Last data filed at 6/8/2024 1300  Gross per 24 hour   Intake 480 ml   Output --   Net 480 ml       Physical Exam:   Musculoskeletal:     Left upper extremity: Incision c/d/i; AIN/PIN/Ulnar motor intact; SILT distally;BCR distally; Radial pulse 2+    Diagnostic Findings:   Significant " Labs:   Recent Lab Results  (Last 5 results in the past 72 hours)        06/09/24  0654   06/08/24  0631   06/08/24  0630   06/07/24  1523   06/07/24  1212        Anaerobe Culture       No Anaerobes Isolated  [P]         Anion Gap 8.0   10.0       8.0       BUN 13.6   18.5       25.4  Comment: Please draw @ 1100       BUN/CREAT RATIO 8   9       11       Calcium 9.4   8.8       8.8  Comment: Please draw @ 1100       Chloride 113   112       113  Comment: Please draw @ 1100       CO2 22   21       21  Comment: Please draw @ 1100       Creatinine 1.65   2.09       2.39  Comment: Please draw @ 1100       CRP, High Sensitivity         128.14  Comment: Please draw @ 1100       Wound Culture       Few Staphylococcus aureus  [P]         eGFR 59   45       38       Glucose 88   104       96  Comment: Please draw @ 1100       GRAM STAIN       Rare WBC observed                No bacteria seen         Hematocrit 42.5     41.1           Hemoglobin 13.6     13.5           Magnesium    2.60             MCH 26.1     26.5           MCHC 32.0     32.8           MCV 81.6     80.7           MPV 9.1     9.1           nRBC 0.0     0.0           Platelet Count 336     302           Potassium 4.2   4.1       3.8  Comment: Please draw @ 1100       RBC 5.21     5.09           RDW 14.4     14.0           Sed Rate         38       Sodium 143   143       142  Comment: Please draw @ 1100       Vancomycin, Random 10.1               Vancomycin-Trough         14.6  Comment: Please draw @ 1100       WBC 8.22     11.38                                   [P] - Preliminary Result                Assessment/Plan:     Active Diagnoses:    Diagnosis Date Noted POA    PRINCIPAL PROBLEM:  Abscess of left hand [L02.512] 06/06/2024 Yes      Problems Resolved During this Admission:         PLAN:   6/7/2024: I&D Left Thumb  -continue abx  -f/u cultures  -transition to oral abx   -f/u Lazar 10 days

## 2024-06-09 NOTE — NURSING
Nagi observed wound before patient discharged.     Pt given discharge information in Kittitian with  ipad. Written and verbal instructions given to patient. Rx for tramadol and clindamycin given to patient. Pt educated on antibiotic instructions and verbalized understanding of finishing home course of antibiotics followed by another 5 day course. Wound care supplies given and pt advised to call MD office or return to ED if symptoms worsen or if there is uncontrolled pain or loss of sensation to the hand.   Pt waiting for ride from a friend

## 2024-06-09 NOTE — DISCHARGE INSTRUCTIONS
Change the bandage every other day. DO NOT GET IT WET. DO NOT TOUCH INCISION. DO NOT USE OINTMENTS OR LOTIONS.   FOLLOW UP WITH YOUR DOCTOR IN 10 DAYS.     finish your prescription of antibiotics you have at home, then take this prescription(Clindamycin) after.     Tramadol is a pain pill that you can use every 6 hours, only if you need it!      Cambie el vendaje cada dos kunal. NO LO MOJES. NO TOQUE LA INCISIÓN. NO UTILICE UNGUENTOS NI LOCIONES.   SEGUIMIENTO CON PATRICK MÉDICO EN 10 DÍAS.     Termine patrick receta de antibióticos que tiene en casa y luego tome esta receta.    Tramadol es un analgésico que puedes usar cada 6 horas, ¡solo si lo necesitas!

## 2024-06-09 NOTE — DISCHARGE SUMMARY
[facility]TH[facility]AED[facility]   [facility] Medicine Discharge Summary    Admit Date: 6/6/2024  Discharge Date and Time: 6/9/20242:45 PM  Admitting Physician: [unfilled]  Discharging Physician: Ollei Silver MD.  Primary Care Physician: Tita, Primary Doctor  Consults (From admission, onward)          Status Ordering Provider     Pharmacy to dose Vancomycin consult  Once        Provider:  (Not yet assigned)    Acknowledged OLLIE SILVER     Inpatient consult to Orthopedic Surgery  Once        Provider:  Guicho Lazar MD    Completed OLLIE SILVER                 Discharge Diagnoses:  Left hand abscess s/p I&D  Wound cx - MRSA  JOSIAH -improved  Diarrhea -resolved      HPI  Todd Garza is a 23 y.o. male who  has a past medical history of Known health problems: none. The patient presented to [facility] on 6/6/2024 with a primary complaint of left hand infection. He was seen yesterday and was discharged on clindamycin. His symptoms persisted so he returned to ER today. An I&D was performed by the ER staff and a wound cx was sent. This infection started about 2 weeks ago due to a wood splinter. He denies any other trauma. PT is a Yoruba speaker, used translation service.       Hospital Course:   The patient was taken to the OR the following day by Dr. Lazar for a washout. His wound culture eventually grew MRSA. He is stable for dc home to complete a 10 day course of clindamycin. During his stay he did have an increase in his creatinine to 2.39. Unclear if this was related to his abx (zosyn and vanc) or the diarrhea he reported. Appropriate measures were taken and his creatinine has improved and is 1.65 on day of dc. His diarrhea has resolved.       Pt was seen and examined on the day of discharge.    Vitals:  VITAL SIGNS: 24 HRS MIN & MAX LAST   Temp  Min: 97.6 °F (36.4 °C)  Max: 98.5 °F (36.9 °C) 98 °F (36.7 °C)   BP  Min: 116/73  Max: 146/76 116/73   Pulse  Min: 76  Max: 89  84    Resp  Min: 17  Max: 18 18   SpO2  Min: 98 %  Max: 99 % 98 %       Physical Exam:  General: In no acute distress, afebrile  Chest: Clear to auscultation bilaterally  Heart: RRR, +S1, S2, no appreciable murmur  Abdomen: Soft, nontender, BS +  MSK: left hand bandaged  Neurologic: Alert and oriented x4  Psych/mental status: Appropriate mood and affect. Responds appropriately to questions.       Procedures Performed:   6/7/24 By Dr. Lazar  Left Thenar compartment release CPT 94200  Left thumb A1 pulley release CPT 87532    Significant Diagnostic Studies: See Full reports for all details    Recent Labs   Lab 06/07/24  0444 06/08/24  0630 06/09/24  0654   WBC 10.59 11.38 8.22   RBC 5.37 5.09 5.21   HGB 14.6 13.5* 13.6*   HCT 43.2 41.1* 42.5   MCV 80.4 80.7 81.6   MCH 27.2 26.5* 26.1*   MCHC 33.8 32.8* 32.0*   RDW 13.9 14.0 14.4    302 336   MPV 9.4 9.1 9.1       Recent Labs   Lab 06/05/24  1035 06/06/24  1101 06/07/24  0444 06/07/24  1212 06/08/24  0631 06/09/24  0654    139   < > 142 143 143   K 4.3 3.9   < > 3.8 4.1 4.2    105   < > 113* 112* 113*   CO2 27 23   < > 21* 21* 22   BUN 12.2 12.5   < > 25.4* 18.5 13.6   CREATININE 1.00 0.83   < > 2.39* 2.09* 1.65*   CALCIUM 9.3 9.8   < > 8.8 8.8 9.4   MG  --   --   --   --  2.60  --    ALBUMIN 4.4 4.2  --   --   --   --    ALKPHOS 117 116  --   --   --   --    ALT 23 19  --   --   --   --    AST 16 14  --   --   --   --    BILITOT 0.5 0.5  --   --   --   --     < > = values in this interval not displayed.        Microbiology Results (last 7 days)       Procedure Component Value Units Date/Time    Wound Culture [[id number]]  (Abnormal)  (Susceptibility) Collected: 06/06/24 1426    Order Status: Completed Specimen: Abscess from Hand, Left Updated: 06/09/24 0940     Wound Culture Many Methicillin resistant Staphylococcus aureus    Anaerobic Culture [[id number]] Collected: 06/07/24 1523    Order Status: Completed Specimen: Wound from Thumb, Left Hand Updated:  06/09/24 0709     Anaerobe Culture No Anaerobes Isolated    Wound Culture [[id number]]  (Abnormal) Collected: 06/07/24 1523    Order Status: Completed Specimen: Wound from Thumb, Left Hand Updated: 06/09/24 0659     Wound Culture Few Staphylococcus aureus    Gram Stain [[id number]] Collected: 06/07/24 1523    Order Status: Completed Specimen: Wound from Thumb, Left Hand Updated: 06/08/24 1114     GRAM STAIN Rare WBC observed      No bacteria seen             CT Hand Without Contrast Left  Narrative: EXAMINATION:  CT HAND WITHOUT CONTRAST LEFT    CLINICAL HISTORY:  Soft tissue infection suspected, hand, xray done;Evaluate for foreign body left hand;    TECHNIQUE:  Helical acquisition through the left hand without IV contrast.  Three plane reconstructions were provided for review.  mGycm. Automatic exposure control, adjustment of mA/kV or iterative reconstruction technique was used to reduce radiation.    COMPARISON:  No priors    FINDINGS:  There is a 3-4 mm foreign body within the dorsal subcutaneous fat adjacent to the distal shaft of the 2nd metacarpal.  No other foreign body is seen.  No fracture or dislocation.  No fluid collection seen.  Impression: Small foreign body as discussed.    Electronically signed by: Mick Hobson  Date:    06/05/2024  Time:    12:09         Medication List        START taking these medications      traMADoL 50 mg tablet  Commonly known as: ULTRAM  Take 1 tablet (50 mg total) by mouth every 6 (six) hours as needed for Pain.            CONTINUE taking these medications      clindamycin 300 MG capsule  Commonly known as: CLEOCIN  Take 1 capsule (300 mg total) by mouth every 6 (six) hours. for 5 days     HYDROcodone-acetaminophen 5-325 mg per tablet  Commonly known as: NORCO  Take 1 tablet by mouth every 6 (six) hours as needed for Pain.               Where to Get Your Medications        You can get these medications from any pharmacy    Bring a paper prescription for each of these  medications  clindamycin 300 MG capsule  traMADoL 50 mg tablet          Explained in detail to the patient the discharge plan, medications, and follow-up visits. Pt understands and agrees with the treatment plan.  Discharge Disposition: Home or Self Care  Discharged Condition: stable  Diet-   Dietary Orders (From admission, onward)       Start     Ordered    06/07/24 1626  Diet Adult Regular  Diet effective now         06/07/24 1625                   Medications Per DC med rec  Activities as tolerated   Follow-up Information       Guicho Lazar MD. Go on 6/19/2024.    Specialties: Hand Surgery, Orthopedic Surgery  Contact information:  [address]  [facility] [address] [phone]                           For further questions contact hospitalist office.    Discharge time >30 minutes    For worsening symptoms, chest pain, shortness of breath, increased abdominal pain, high grade fever, stroke or stroke like symptoms, immediately go to the nearest Emergency Room or call 911 as soon as possible.      Ollie Oliveira M.D, on 6/9/2024. at 2:45 PM.

## 2024-06-09 NOTE — PROGRESS NOTES
Pharmacokinetic Assessment Follow Up: IV Vancomycin    Vancomycin serum concentration assessment(s):    The random level was drawn correctly and can be used to guide therapy at this time. The measurement is below the desired definitive target range of 15 to 20 mcg/mL.    Vancomycin Regimen Plan:    Change regimen to Vancomycin 1250 mg IV every 18 hours with next serum trough concentration measured at 2100 prior to 4th dose on 06/10    Drug levels (last 3 results):  Recent Labs   Lab Result Units 06/07/24  1212 06/09/24  0654   Vancomycin Random ug/ml  --  10.1*   Vancomycin Trough ug/ml 14.6*  --        Pharmacy will continue to follow and monitor vancomycin.    Please contact pharmacy at extension [phone] for questions regarding this assessment.    Thank you for the consult,   Yazan Martell       Patient brief summary:  Todd Garza is a 23 y.o. male initiated on antimicrobial therapy with IV Vancomycin for treatment of skin & soft tissue infection    The patient's current regimen is pulse dosing due to renal function decline    Drug Allergies:   Review of patient's allergies indicates:  No Known Allergies    Actual Body Weight:   78 kg    Renal Function:   Estimated Creatinine Clearance: 67.9 mL/min (A) (based on SCr of 1.65 mg/dL (H)).,     Dialysis Method (if applicable):  N/A    CBC (last 72 hours):  Recent Labs   Lab Result Units 06/06/24  1101 06/07/24  0444 06/08/24  0630 06/09/24  0654   WBC x10(3)/mcL 9.95 10.59 11.38 8.22   Hgb g/dL 15.7 14.6 13.5* 13.6*   Hct % 47.0 43.2 41.1* 42.5   Platelet x10(3)/mcL 347 281 302 336   Mono % % 10.9  --   --   --    Eos % % 3.5  --   --   --    Basophil % % 0.3  --   --   --        Metabolic Panel (last 72 hours):  Recent Labs   Lab Result Units 06/06/24  1101 06/07/24  0444 06/07/24  1212 06/08/24  0631 06/09/24  0654   Sodium mmol/L 139 139 142 143 143   Potassium mmol/L 3.9 4.0 3.8 4.1 4.2   Chloride mmol/L 105 109* 113* 112* 113*   CO2 mmol/L 23 20* 21*  21* 22   Glucose mg/dL 86 109* 96 104* 88   Blood Urea Nitrogen mg/dL 12.5 27.5* 25.4* 18.5 13.6   Creatinine mg/dL 0.83 2.33* 2.39* 2.09* 1.65*   Albumin g/dL 4.2  --   --   --   --    Bilirubin Total mg/dL 0.5  --   --   --   --    ALP unit/L 116  --   --   --   --    AST unit/L 14  --   --   --   --    ALT unit/L 19  --   --   --   --    Magnesium Level mg/dL  --   --   --  2.60  --        Vancomycin Administrations:  vancomycin given in the last 96 hours                     vancomycin 1,500 mg in dextrose 5 % (D5W) 250 mL IVPB (Vial-Mate) (mg) 1,500 mg New Bag 06/08/24 1300    vancomycin 1,250 mg in dextrose 5 % (D5W) 250 mL IVPB (Vial-Mate) (mg) 1,250 mg New Bag 06/06/24 2338    vancomycin 1,500 mg in dextrose 5 % (D5W) 250 mL IVPB (Vial-Mate) ()  Restarted 06/06/24 1315     1,500 mg New Bag  1230                    Microbiologic Results:  Microbiology Results (last 7 days)       Procedure Component Value Units Date/Time    Anaerobic Culture [[id number]] Collected: 06/07/24 1523    Order Status: Completed Specimen: Wound from Thumb, Left Hand Updated: 06/09/24 0709     Anaerobe Culture No Anaerobes Isolated    Wound Culture [[id number]]  (Abnormal) Collected: 06/07/24 1523    Order Status: Completed Specimen: Wound from Thumb, Left Hand Updated: 06/09/24 0659     Wound Culture Few Staphylococcus aureus    Gram Stain [[id number]] Collected: 06/07/24 1523    Order Status: Completed Specimen: Wound from Thumb, Left Hand Updated: 06/08/24 1114     GRAM STAIN Rare WBC observed      No bacteria seen    Wound Culture [[id number]]  (Abnormal) Collected: 06/06/24 1426    Order Status: Completed Specimen: Abscess from Hand, Left Updated: 06/08/24 0712     Wound Culture Many Staphylococcus aureus

## 2024-06-10 ENCOUNTER — TELEPHONE (OUTPATIENT)
Dept: ORTHOPEDICS | Facility: CLINIC | Age: 24
End: 2024-06-10

## 2024-06-10 LAB
BACTERIA BLD CULT: NORMAL
BACTERIA BLD CULT: NORMAL
BACTERIA SPEC ANAEROBE CULT: NORMAL
BACTERIA WND CULT: ABNORMAL

## 2024-06-10 NOTE — TELEPHONE ENCOUNTER
Patient phone number is not in service.    Called patient emergency contact Misa. When I spoke with her I let her know time and date of appointment. Along with location.     Friend verbally agreed and will call back with further questions or concerns.

## 2024-06-17 ENCOUNTER — OFFICE VISIT (OUTPATIENT)
Dept: ORTHOPEDICS | Facility: CLINIC | Age: 24
End: 2024-06-17

## 2024-06-17 VITALS
BODY MASS INDEX: 27.63 KG/M2 | HEART RATE: 83 BPM | SYSTOLIC BLOOD PRESSURE: 133 MMHG | DIASTOLIC BLOOD PRESSURE: 77 MMHG | WEIGHT: 171.94 LBS | HEIGHT: 66 IN

## 2024-06-17 DIAGNOSIS — L02.512 ABSCESS OF LEFT HAND: Primary | ICD-10-CM

## 2024-06-17 PROCEDURE — 99024 POSTOP FOLLOW-UP VISIT: CPT | Mod: ,,,

## 2024-06-17 RX ORDER — CLINDAMYCIN HYDROCHLORIDE 300 MG/1
300 CAPSULE ORAL EVERY 6 HOURS
COMMUNITY

## 2024-06-17 NOTE — LETTER
[facility]  [address] [place] [address]. [address]  [place], [address]  Phone: [phone]  Fax: [phone]    Name:Todd Garza  :2000   Date:2024       PATIENT IS ABLE TO RETURN TO WORK AS OF: 24     [] SEDENTARY WORK: Lifting 10 pounds maximum and occasionally lifting and/or carrying articles such as dockers, ledgers and small tools.  Although a sedentary job is defined as one which involved sitting, a certain amount of walking and standing are required only occasionally and other sedentary criteria are met.    [] LIGHT WORK:     [] MEDIUM WORK: Lifting of 50 pounds maximum with frequent lifting and/or carrying of objects up to 25 pounds.    [] HEAVY WORK: Lifting of 100 pounds maximum with frequent lifting and/or carrying objects up to 50 pounds.    [] VERY HEAVY WORK: Lifting objects in excess of 100 pounds with frequent lifting and/or carrying of objects weighing 50 pounds or more.    [x] REGULAR DUTY: [x] No Restrictions. [] With Restrictions       Thank you,   Dara Murillo PA-C/ARLETL

## 2024-06-17 NOTE — PROGRESS NOTES
"Postop Clinic Note    Surgery:  06/07/2024  Left thenar compartment release  Left thumb A1 pulley release    History of present illness:    Patient is doing well.  He denies fever or chills.  He denies pain or numbness.      Past Surgical History:   Procedure Laterality Date    IRRIGATION AND DEBRIDEMENT OF UPPER EXTREMITY Left 6/7/2024    Procedure: IRRIGATION AND DEBRIDEMENT, UPPER EXTREMITY;  Surgeon: Guicho Lazar MD;  Location: [facility];  Service: Orthopedics;  Laterality: Left;  I&D hand cysto tubing    none             Examination:    Vital Signs:    Vitals:    06/17/24 0921   BP: 133/77   Pulse: 83   Weight: 78 kg (171 lb 15.3 oz)   Height: 5' 6" (1.676 m)       Body mass index is 27.75 kg/m².    Constitution:   Well-developed, well nourished patient in no acute distress.  Neurological:   Alert and oriented x 3 and cooperative to examination.     Psychiatric/Behavior: Normal mental status.  Respiratory:   No shortness of breath.  Eyes:    Extraoccular muscles intact  Skin:    No scars, rash or suspicious lesions.    MSK:   Left upper extremity:  Nonabsorbable sutures in place.  Incision is healing well with no signs of infection.  Full range of motion of digits hand and wrist.  Radial pulse 2 +warm and well perfused.      Imaging:   No new imaging     Assessment:  Post left thenar compartment release and left thumb A1 pulley release    Plan:  Sutures will be removed in clinic today.  Educated patient on scar massages.  He will continue to finish his course of clindamycin.  He is okay to return to work.  He can follow up as needed.    Follow Up:  As needed  Xray at next visit:  None     "

## (undated) DEVICE — GLOVE PROTEXIS PI CRM 7

## (undated) DEVICE — IRRIGATION SET Y-TYPE TUR/BLAD

## (undated) DEVICE — GLOVE SENSICARE PI GRN 6.5

## (undated) DEVICE — PAD CAST 6X4YD SPECIALISTIC

## (undated) DEVICE — BLADE SURG STAINLESS STEEL #15

## (undated) DEVICE — BANDAGE MATRIX HK LOOP 4IN 5YD

## (undated) DEVICE — PADDING 4X4YD SPECIALIST100

## (undated) DEVICE — GOWN SMARTGOWN 3XL XLONG

## (undated) DEVICE — GLOVE SENSICARE PI SURG 6.5

## (undated) DEVICE — TAPE SILK 3IN

## (undated) DEVICE — CORD BIPOLAR 12 FOOT

## (undated) DEVICE — GLOVE SENSICARE PI GRN 7.5

## (undated) DEVICE — DRESSING XEROFORM 5X9IN

## (undated) DEVICE — DRAPE HAND STERILE

## (undated) DEVICE — Device

## (undated) DEVICE — SPONGE KERLIX SUPER 6X6.75IN

## (undated) DEVICE — COVER FULLGUARD SHOE HIGH-TOP

## (undated) DEVICE — ELECTRODE REM POLYHESIVE II

## (undated) DEVICE — BANDAGE ESMARK LATEX FREE 4INX

## (undated) DEVICE — GOWN NONREINF SET-IN SLV XL